# Patient Record
Sex: MALE | Race: WHITE | NOT HISPANIC OR LATINO | Employment: OTHER | URBAN - METROPOLITAN AREA
[De-identification: names, ages, dates, MRNs, and addresses within clinical notes are randomized per-mention and may not be internally consistent; named-entity substitution may affect disease eponyms.]

---

## 2017-01-07 ENCOUNTER — HOSPITAL ENCOUNTER (OUTPATIENT)
Dept: NON INVASIVE DIAGNOSTICS | Facility: HOSPITAL | Age: 62
Discharge: HOME | End: 2017-01-07
Attending: INTERNAL MEDICINE
Payer: COMMERCIAL

## 2017-01-07 DIAGNOSIS — I10 ESSENTIAL (PRIMARY) HYPERTENSION: ICD-10-CM

## 2017-01-07 DIAGNOSIS — E78.5 HYPERLIPIDEMIA: ICD-10-CM

## 2017-01-07 PROCEDURE — 93306 TTE W/DOPPLER COMPLETE: CPT

## 2017-04-03 ENCOUNTER — ALLSCRIPTS OFFICE VISIT (OUTPATIENT)
Dept: OTHER | Facility: OTHER | Age: 62
End: 2017-04-03

## 2017-12-04 ENCOUNTER — ALLSCRIPTS OFFICE VISIT (OUTPATIENT)
Dept: OTHER | Facility: OTHER | Age: 62
End: 2017-12-04

## 2017-12-06 NOTE — PROGRESS NOTES
Assessment  Assessed    1  Dyslipidemia (272 4) (E78 5)   2  Hypertension (401 9) (I10)    Plan  Dyslipidemia, Hypertension    · Follow-up visit in 6 months Evaluation and Treatment  Follow-up  Status: Hold For -Scheduling  Requested for: [id number]   Ordered; For: Dyslipidemia, Hypertension; Ordered By: Matt Velarde Performed:  Due: [id number]  Hypertension    · AmLODIPine Besylate 10 MG Oral Tablet; TAKE 1 TABLET DAILY   Rx By: Matt Velarde; Dispense: 30 Days ; #:30 Tablet; Refill: 5;Hypertension; TOÑITO = N; Sent To: [facility]    Discussion/Summary  Cardiology Discussion Summary Free Text Note Form [facility]:   1  Hypertension  Patient's blood pressure is well controlled with current Rx of amlodipine and losartan HCT  Continue same Rx  Blood pressures acceptable  Patient's prescription for amlodipine renewedDyslipidemia  Patient cholesterol profile is susceptible as per new cholesterol guidelines his risk is around 9%  Discussed with patient at length that as per the current guideline patient can be on moderate dose statin therapy  Patient has been tolerating Lipitor without any problems  Labs reviewed  Cholesterol has significantly improved  No muscle pain liver enzymes are acceptable  Continue same dose6 months  Goals and Barriers: The patient has the current Goals: To stay healthy  The patent has the current Barriers: None  Patient's Capacity to Self-Care: Patient is able to Self-Care  Medication SE Review and Pt Understands Tx: Possible side effects of new medications were reviewed with the patient/guardian today  Counseling Documentation With Imm: The patient was counseled regarding diagnostic results,-- instructions for management,-- risk factor reductions,-- prognosis,-- patient and family education,-- impressions,-- risks and benefits of treatment options,-- importance of compliance with treatment        Chief Complaint  Chief Complaint Free Text Note Form: Maria Fernanda Maria Teresa is here today for a routine followup  He Denies any cardiac complaints in the office today  JW   Chief Complaint Chronic Condition [facility]: Patient is here today for follow up of chronic conditions described in HPI  History of Present Illness  Cardiology HPI Free Text Note Form [facility]: 26-year-old male with history of hypertension hyperlipidemia benign prostate hypertrophy who was initially seen by me few years ago for hypertension  His cholesterol was previously diet controlled and has been doing well but over the year due to his age now his risk is higher and he is willing to go on statin therapy  He denies any chest pain or any shortness of breath  No fever no chills no PND no orthopnea no other significant complaint  came for follow-up  No new complaints  Denies any chest pain or any shortness of breath  No fever no chills no nausea no vomiting no PND no orthopnea  He is tolerating his medications very well  He had a blood test done at his work  Cholesterol has significantly improved  LFTs were acceptable  No other significant complaint  He still very active      Review of Systems  Cardiology Male ROS:    Cardiac: as noted in HPI  Skin: No complaints of nonhealing sores or skin rash  Genitourinary: No complaints of recurrent urinary tract infections, frequent urination at night, difficult urination, blood in urine, kidney stones, loss of bladder control, no kidney or prostate problems, no erectile dysfunction  Psychological: No complaints of feeling depressed, anxiety, panic attacks, or difficulty concentrating  General: No complaints of trouble sleeping, lack of energy, fatigue, appetite changes, weight changes, fever, frequent infections, or night sweats  Respiratory: No complaints of shortness of breath, cough with sputum, or wheezing  HEENT: No complaints of serious problems, hearing problems, nose problems, throat problems, or snoring    Gastrointestinal: No complaints of liver problems, nausea, vomiting, heartburn, constipation, bloody stools, diarrhea, problems swallowing, adbominal pain, or rectal bleeding  Hematologic: No complaints of bleeding disorders, anemia, blood clots, or excessive brusing  Neurological: No complaints of numbness, tingling, dizziness, weakness, seizures, headaches, syncope or fainting, AM fatigue, daytime sleepiness, no witnessed apnea episodes  Musculoskeletal: No complaints of arthritis, back pain, or painfull swelling  ROS Reviewed:   ROS reviewed  Active Problems  Problems    1  Dyslipidemia (272 4) (E78 5)   2  Hypertension (401 9) (I10)    Past Medical History  Active Problems And Past Medical History Reviewed: The active problems and past medical history were reviewed and updated today  Surgical History  Surgical History Reviewed: The surgical history was reviewed and updated today  Family History  Mother    1  Family history of diabetes mellitus (V18 0) (Z83 3)  Father    2  Family history of myocardial infarction (V17 3) (Z82 49)  Family History Reviewed: The family history was reviewed and updated today  Social History  Problems    · Never a smoker  Social History Reviewed: The social history was reviewed and updated today  Current Meds   1  AmLODIPine Besylate 10 MG Oral Tablet; TAKE 1 TABLET DAILY  Requested for: [id number]; Last FW:[id number] Ordered   2  Atorvastatin Calcium 10 MG Oral Tablet; TAKE 1 TABLET AT BEDTIME; Therapy: [id number] to (Samm Bates)  Requested for: 57UWB5474; Last Rx:02Led0117 Ordered   3  Losartan Potassium-HCTZ 50-12 5 MG Oral Tablet; TAKE 1 TABLET DAILY; Therapy: 23XBK1267 to (Yovani Hollis)  Requested for: 11Rnd2690; Last Rx:68Mmq0364 Ordered  Medication List Reviewed: The medication list was reviewed and updated today  Allergies  Medication    1   No Known Drug Allergies    Vitals  Vital Signs    Recorded: 16GUD0477 03:49PM   Heart Rate 69, Apical   Systolic 750, LUE, Sitting   Diastolic 70, LUE, Sitting Height 5 ft 4 in   Weight 138 lb    BMI Calculated 23 69   BSA Calculated 1 67   O2 Saturation 95, RA       Physical Exam   Constitutional  General appearance: No acute distress, well appearing and well nourished  Eyes  Conjunctiva and Sclera examination: Conjunctiva pink, sclera anicteric  Ears, Nose, Mouth, and Throat - Oropharynx: Clear, nares are clear, mucous membranes are moist   Neck  Neck and thyroid: Normal, supple, trachea midline, no thyromegaly  Pulmonary  Respiratory effort: No increased work of breathing or signs of respiratory distress  Auscultation of lungs: Clear to auscultation, no rales, no rhonchi, no wheezing, good air movement  Cardiovascular  Auscultation of heart: Normal rate and rhythm, normal S1 and S2, no murmurs  Carotid pulses: Normal, 2+ bilaterally  Peripheral vascular exam: Normal pulses throughout, no tenderness, erythema or swelling  Pedal pulses: Normal, 2+ bilaterally  Examination of extremities for edema and/or varicosities: Normal    Abdomen  Abdomen: Non-tender and no distention  Liver and spleen: No hepatomegaly or splenomegaly  Musculoskeletal Gait and station: Normal gait  -- Digits and nails: Normal without clubbing or cyanosis  -- Inspection/palpation of joints, bones, and muscles: Normal, ROM normal    Skin - Skin and subcutaneous tissue: Normal without rashes or lesions  Skin is warm and well perfused, normal turgor  Neurologic - Cranial nerves: II - XII intact  -- Speech: Normal    Psychiatric - Orientation to person, place, and time: Normal -- Mood and affect: Normal       Results/Data  Diagnostic Studies Reviewed Cardio: I personally reviewed the recording/images in the office today  My interpretation follows  Lab Review: Patient has blood test done outside  Cholesterol was 147 triglyceride 52 HDL 60 LDL 76  LFTs are acceptable  Hemoglobin 14 4 hematocrit 45  HbA1c 6 0  LFTs were acceptable  Potassium 3 8 sodium 141    Stress Test: Nuclear stress test in December 2015 shows no ischemia EF was 60%  Echocardiogram/TAMIKA: Echo Doppler in 0223 shows a systolic function, mild MR, and a repeat echo in January 2017 shows normal systolic function with no significant change  Mild MR was noted     ECG Report: Twelve-lead EKG done 4/3/2017 shows normal sinus done no significant fracture changes no change from old EKG      Signatures   Electronically signed by : SONA Kovacs ; Dec  4 2017  4:46PM EST                       (Author)

## 2018-01-12 VITALS
WEIGHT: 134 LBS | SYSTOLIC BLOOD PRESSURE: 122 MMHG | HEART RATE: 71 BPM | HEIGHT: 64 IN | BODY MASS INDEX: 22.88 KG/M2 | DIASTOLIC BLOOD PRESSURE: 70 MMHG | OXYGEN SATURATION: 98 %

## 2018-01-22 VITALS
HEART RATE: 69 BPM | BODY MASS INDEX: 23.56 KG/M2 | WEIGHT: 138 LBS | DIASTOLIC BLOOD PRESSURE: 70 MMHG | SYSTOLIC BLOOD PRESSURE: 118 MMHG | OXYGEN SATURATION: 95 % | HEIGHT: 64 IN

## 2018-04-02 DIAGNOSIS — I10 ESSENTIAL HYPERTENSION: Primary | ICD-10-CM

## 2018-04-02 RX ORDER — LOSARTAN POTASSIUM AND HYDROCHLOROTHIAZIDE 12.5; 5 MG/1; MG/1
1 TABLET ORAL DAILY
COMMUNITY
Start: 2017-03-01 | End: 2018-04-02 | Stop reason: SDUPTHER

## 2018-04-02 RX ORDER — LOSARTAN POTASSIUM AND HYDROCHLOROTHIAZIDE 12.5; 5 MG/1; MG/1
1 TABLET ORAL DAILY
Qty: 30 TABLET | Refills: 5 | Status: SHIPPED | OUTPATIENT
Start: 2018-04-02 | End: 2018-09-29 | Stop reason: SDUPTHER

## 2018-04-30 ENCOUNTER — TELEPHONE (OUTPATIENT)
Dept: CARDIOLOGY CLINIC | Facility: CLINIC | Age: 63
End: 2018-04-30

## 2018-04-30 DIAGNOSIS — E78.5 DYSLIPIDEMIA: Primary | ICD-10-CM

## 2018-04-30 RX ORDER — ATORVASTATIN CALCIUM 10 MG/1
1 TABLET, FILM COATED ORAL
COMMUNITY
Start: 2017-04-03 | End: 2018-04-30 | Stop reason: SDUPTHER

## 2018-04-30 RX ORDER — ATORVASTATIN CALCIUM 10 MG/1
10 TABLET, FILM COATED ORAL
Qty: 30 TABLET | Refills: 3 | Status: SHIPPED | OUTPATIENT
Start: 2018-04-30 | End: 2018-08-26 | Stop reason: SDUPTHER

## 2018-06-12 RX ORDER — AMLODIPINE BESYLATE 10 MG/1
1 TABLET ORAL DAILY
COMMUNITY
End: 2018-06-26 | Stop reason: SDUPTHER

## 2018-06-26 DIAGNOSIS — I10 ESSENTIAL HYPERTENSION: Primary | ICD-10-CM

## 2018-06-26 RX ORDER — AMLODIPINE BESYLATE 10 MG/1
10 TABLET ORAL DAILY
Qty: 30 TABLET | Refills: 5 | Status: SHIPPED | OUTPATIENT
Start: 2018-06-26 | End: 2019-01-15 | Stop reason: SDUPTHER

## 2018-07-03 ENCOUNTER — OFFICE VISIT (OUTPATIENT)
Dept: CARDIOLOGY CLINIC | Facility: CLINIC | Age: 63
End: 2018-07-03

## 2018-07-03 VITALS
SYSTOLIC BLOOD PRESSURE: 108 MMHG | OXYGEN SATURATION: 97 % | HEIGHT: 64 IN | HEART RATE: 58 BPM | WEIGHT: 136 LBS | BODY MASS INDEX: 23.22 KG/M2 | DIASTOLIC BLOOD PRESSURE: 60 MMHG

## 2018-07-03 DIAGNOSIS — E78.5 DYSLIPIDEMIA: ICD-10-CM

## 2018-07-03 DIAGNOSIS — F41.9 ANXIETY: ICD-10-CM

## 2018-07-03 DIAGNOSIS — I10 ESSENTIAL HYPERTENSION: ICD-10-CM

## 2018-07-03 DIAGNOSIS — R01.1 CARDIAC MURMUR: ICD-10-CM

## 2018-07-03 PROCEDURE — 99214 OFFICE O/P EST MOD 30 MIN: CPT | Performed by: INTERNAL MEDICINE

## 2018-07-03 PROCEDURE — 93000 ELECTROCARDIOGRAM COMPLETE: CPT | Performed by: INTERNAL MEDICINE

## 2018-07-03 RX ORDER — PHENOL 1.4 %
600 AEROSOL, SPRAY (ML) MUCOUS MEMBRANE 2 TIMES DAILY WITH MEALS
COMMUNITY
End: 2022-07-28

## 2018-07-03 RX ORDER — CHLORAL HYDRATE 500 MG
1000 CAPSULE ORAL DAILY
COMMUNITY
End: 2022-07-28

## 2018-07-03 RX ORDER — GLUCOSAMINE SULFATE 500 MG
500 CAPSULE ORAL
COMMUNITY

## 2018-07-03 NOTE — PROGRESS NOTES
Progress Note - Cardiology Office  [facility]    Dian Soto 58 y o  male MRN: [id number]  : 1955  Encounter: [id number]      Assessment:     1  Essential hypertension    2  Dyslipidemia    3  Anxiety    4  Cardiac murmur        Discussion Summary and Plan:  1  Hypertension  Patient's blood pressure is well controlled with current Rx of amlodipine and losartan HCT  Continue same Rx  Blood pressures acceptable  Patient's prescription for amlodipine renewed  Since patient is on diuretics and Ace inhibitors will check electrolytes  Check CBC    2  Cardiac murmur  Patient's echo reviewed with the patient  He did have thickening of aortic valve with mild MR  No other significant valvular disease  The loudness of murmur may be high it as patient is thin chested  3  Dyslipidemia  Patient cholesterol profile is susceptible as per new cholesterol guidelines his risk is around 9%  On statin therapy  Check fasting lipids and LFTs and TSH    4  Anxiety  All issues related to patient condition discussed with him at length  Follow-up in 6 months    Counseling :  A description of the counseling  Goals and Barriers  Patient's ability to self care: Yes  Medication side effect reviewed with patient in detail and all their questions answered to their satisfaction  HPI :     Dian Soto is a 58y o  year old male who came for follow up  Patient has history of hypertension hyperlipidemia benign prostate hypertrophy who was initially seen by me few years ago for hypertension  His cholesterol was previously diet controlled and has been doing well but over the year due to his age now his risk is higher and he is willing to go on statin therapy  He denies any chest pain or any shortness of breath  No fever no chills no PND no orthopnea no other significant complaint   /2017came for follow-up  No new complaints  Denies any chest pain or any shortness of breath   No fever no chills no nausea no vomiting no PND no orthopnea  He is tolerating his medications very well  He had a blood test done at his work  Cholesterol has significantly improved  LFTs were acceptable  No other significant complaint  He still very active  07/03/2018  Above reviewed  Patient came for follow-up  He is doing much better  Blood pressure is acceptable  He is tolerating his medications well  Denies any chest pain and shortness of breath  Arthritis is also better  No nausea no vomiting no other significant complaint  Review of Systems   Constitutional: Negative for activity change, chills, diaphoresis, fever and unexpected weight change  HENT: Negative for congestion  Eyes: Negative for discharge and redness  Respiratory: Negative for cough, chest tightness, shortness of breath and wheezing  Cardiovascular: Negative  Negative for chest pain, palpitations and leg swelling  Gastrointestinal: Negative for abdominal pain, diarrhea and nausea  Endocrine: Negative  Genitourinary: Negative for decreased urine volume and urgency  Musculoskeletal: Negative  Negative for arthralgias, back pain and gait problem  Skin: Negative for rash and wound  Allergic/Immunologic: Negative  Neurological: Negative for dizziness, seizures, syncope, weakness, light-headedness and headaches  Hematological: Negative  Psychiatric/Behavioral: Negative for agitation and confusion  The patient is not nervous/anxious  Historical Information   History reviewed  No pertinent past medical history  History reviewed  No pertinent surgical history    History   Alcohol Use    Yes     Comment: occ     History   Drug Use No     History   Smoking Status    Never Smoker   Smokeless Tobacco    Never Used     Family History:   Family History   Problem Relation Age of Onset    Kidney failure Father     Hypertension Brother        Meds/Allergies     No Known Allergies    Current Outpatient Prescriptions:    amLODIPine (NORVASC) 10 mg tablet, Take 1 tablet (10 mg total) by mouth daily, Disp: 30 tablet, Rfl: 5    atorvastatin (LIPITOR) 10 mg tablet, Take 1 tablet (10 mg total) by mouth daily after dinner, Disp: 30 tablet, Rfl: 3    calcium carbonate (OS-CLEVELAND) 600 MG tablet, Take 600 mg by mouth 2 (two) times a day with meals, Disp: , Rfl:     glucosamine 500 MG CAPS capsule, Take 500 mg by mouth, Disp: , Rfl:     losartan-hydrochlorothiazide (HYZAAR) 50-12 5 mg per tablet, Take 1 tablet by mouth daily, Disp: 30 tablet, Rfl: 5    Menaquinone-7 (VITAMIN K2 PO), Take by mouth, Disp: , Rfl:     Omega-3 Fatty Acids (FISH OIL) 1,000 mg, Take 1,000 mg by mouth daily, Disp: , Rfl:     Vitals: Blood pressure 108/60, pulse 58, height 5' 4" (1 626 m), weight 61 7 kg (136 lb), SpO2 97 %  Body mass index is 23 34 kg/m²  Vitals:    07/03/18 1227   Weight: 61 7 kg (136 lb)     BP Readings from Last 3 Encounters:   07/03/18 108/60   12/04/17 118/70   04/03/17 122/70       Physical Exam:    Physical Exam     Neurologic:  Alert & oriented x 3, no new focal deficits, Not in any acute distress,  Constitutional:  Well developed, well nourished, non-toxic appearance   Eyes:  Pupil equal and reacting to light, conjunctiva normal, No JVP, No LNP   HENT:  Atraumatic, oropharynx moist, Neck- normal range of motion, no tenderness,  Neck supple   Respiratory:  Bilateral air entry, mostly clear to auscultation  Cardiovascular: S1-S2 regular with a 3/6 ejection systolic murmur and S4 is present  GI:  Soft, nondistended, normal bowel sounds, nontender, no hepatosplenomegaly appreciated  Musculoskeletal:  No edema, no tenderness, no deformities  Skin:  Well hydrated, no rash   Lymphatic:  No lymphadenopathy noted   Extremities:  No edema and distal pulses are present      Diagnostic Studies Review Cardio:  Lab Review: Patient has blood test done outside  Cholesterol was 147 triglyceride 52 HDL 60 LDL 76  LFTs are acceptable   Hemoglobin 14 4 hematocrit 45  HbA1c 6 0  LFTs were acceptable  Potassium 3 8 sodium 141  Stress Test: Nuclear stress test in 2015 shows no ischemia EF was 60%  Echocardiogram/TAMIKA: Echo Doppler in  shows a systolic function, mild MR, and a repeat echo in 2017 shows normal systolic function with no significant change  Mild MR was noted  ECG Report: Twelve-lead EKG done 4/3/2017 shows normal sinus done no significant   ST changes no change from old EKG     Repeat EKG shows normal sinus rhythm heart rate 58 beats per minute  No significant ST changes  High voltage may be due to thin chest         Cardiac testing:   Results for orders placed during the hospital encounter of 17   Echo complete with contrast if indicated    Narrative [address]  [address] [facility] [address]  [phone]    Transthoracic Echocardiogram  2D, M-mode, Doppler, and Color Doppler    Study date:  2017    Patient: Shazia Bowden  MR number: [id number]  Account number: [de-identified]  : 1955  Age: 57 years  Gender: Male  Status: Routine  Location: Echo lab  Height: 64 in  Weight: 137 7 lb  BP: 154/ 85 mmHg    Indications: HTN, Hyperlipidemia    Diagnoses: I11 9 - Hypertensive heart disease without heart failure    Sonographer:  ELENITA Kitchen  Primary Physician:  Mynor Sierra MD  Referring Physician:  Juan C Bautista MD  Group:  Marina Bloom  Interpreting Physician:  Juan C Bautista MD    SUMMARY    LEFT VENTRICLE:  Systolic function was normal by visual assessment  Ejection fraction was  estimated in the range of 55 % to 60 % to be 55 %  Wall thickness was at the upper limits of normal   Doppler parameters were consistent with abnormal left ventricular relaxation  (grade 1 diastolic dysfunction)  MITRAL VALVE:  There was mild regurgitation  TRICUSPID VALVE:  There was mild regurgitation  Estimated peak PA pressure was 35 mmHg      PULMONIC VALVE:  There was mild regurgitation  HISTORY: PRIOR HISTORY: HTN, Hyperlipidemia    PROCEDURE: The procedure was performed in the echo lab  This was a routine  study  The transthoracic approach was used  The study included complete 2D  imaging, M-mode, complete spectral Doppler, and color Doppler  The heart rate  was 67 bpm, at the start of the study  Images were obtained from the  parasternal, apical, subcostal, and suprasternal notch acoustic windows  Image  quality was good  LEFT VENTRICLE: Size was normal  Systolic function was normal by visual  assessment  Ejection fraction was estimated in the range of 55 % to 60 % to be  55 %  Wall thickness was at the upper limits of normal  DOPPLER: Doppler  parameters were consistent with abnormal left ventricular relaxation (grade 1  diastolic dysfunction)  RIGHT VENTRICLE: The size was normal  Systolic function was normal     LEFT ATRIUM: Size was at the upper limits of normal     RIGHT ATRIUM: Size was normal     MITRAL VALVE: Valve structure was normal  There was normal leaflet separation  DOPPLER: The transmitral velocity was within the normal range  There was no  evidence for stenosis  There was mild regurgitation  AORTIC VALVE: The valve was trileaflet  Leaflets exhibited mildly increased  thickness  DOPPLER: There was no evidence for stenosis  There was no  regurgitation  TRICUSPID VALVE: DOPPLER: There was mild regurgitation  Estimated peak PA  pressure was 35 mmHg  PULMONIC VALVE: Not well visualized  DOPPLER: There was mild regurgitation  PERICARDIUM: There was no thickening or calcification  There was no pericardial  effusion  SYSTEMIC VEINS: IVC: The inferior vena cava was normal in size   Respirophasic  changes were normal     SYSTEM MEASUREMENT TABLES    2D mode  AoR Diam 2D: 3 4 cm  LA Diam (2D): 3 5 cm  LA/Ao (2D): 1 03  FS (2D Teich): 30 1 %  IVSd (2D): 1 13 cm  LVDEV: 69 6 cm³  LVESV: 29 3 cm³  LVIDd(2D): 3 99 cm  LVISd (2D): 2 79 cm  LVOT Area 2D: 3 14 cm squared  LVPWd (2D): 1 21 cm  SV (Teich): 40 3 cm³    Apical four chamber  LVEF A4C: 60 %    Unspecified Scan Mode  SHONA Cont Eq (Peak Anup): 2 19 cm squared  LVOT Diam : 2 cm  LVOT Vmax: 1060 mm/s  LVOT Vmax; Mean: 1060 mm/s  Peak Grad ; Mean: 4 mm[Hg]  MV Peak A Anup: 795 mm/s  MV Peak E Anup  Mean: 597 mm/s  MVA (PHT): 3 44 cm squared  PHT: 64 ms  Max P mm[Hg]  V Max: 2610 mm/s  Vmax: 2640 mm/s  RA Area: 20 2 cm squared  RA Volume: 58 4 cm³  TAPSE: 2 1 cm    [employer] Accredited Echocardiography Laboratory    Prepared and electronically signed by    James Gama MD  Signed 2017 20:52:49       Lab Review  Labs requested    Dr James Gama MD [facility] [place]      "This note has been constructed using a voice recognition system  Therefore there may be syntax, spelling, and/or grammatical errors   Please call if you have any questions  "

## 2018-08-26 DIAGNOSIS — E78.5 DYSLIPIDEMIA: ICD-10-CM

## 2018-08-27 RX ORDER — ATORVASTATIN CALCIUM 10 MG/1
TABLET, FILM COATED ORAL
Qty: 30 TABLET | Refills: 3 | Status: SHIPPED | OUTPATIENT
Start: 2018-08-27 | End: 2019-01-15 | Stop reason: SDUPTHER

## 2018-09-29 DIAGNOSIS — I10 ESSENTIAL HYPERTENSION: ICD-10-CM

## 2018-09-29 RX ORDER — LOSARTAN POTASSIUM AND HYDROCHLOROTHIAZIDE 12.5; 5 MG/1; MG/1
TABLET ORAL
Qty: 30 TABLET | Refills: 5 | Status: SHIPPED | OUTPATIENT
Start: 2018-09-29 | End: 2019-03-22 | Stop reason: ALTCHOICE

## 2018-11-19 ENCOUNTER — TRANSCRIBE ORDERS (OUTPATIENT)
Dept: ADMINISTRATIVE | Facility: HOSPITAL | Age: 63
End: 2018-11-19

## 2018-11-19 DIAGNOSIS — R31.1 BENIGN ESSENTIAL MICROSCOPIC HEMATURIA: Primary | ICD-10-CM

## 2018-11-24 ENCOUNTER — HOSPITAL ENCOUNTER (OUTPATIENT)
Dept: RADIOLOGY | Facility: HOSPITAL | Age: 63
Discharge: HOME | End: 2018-11-24
Attending: SPECIALIST
Payer: COMMERCIAL

## 2018-11-24 DIAGNOSIS — R31.1 BENIGN ESSENTIAL MICROSCOPIC HEMATURIA: ICD-10-CM

## 2018-11-24 PROCEDURE — 74178 CT ABD&PLV WO CNTR FLWD CNTR: CPT

## 2018-11-24 RX ADMIN — IOHEXOL 100 ML: 350 INJECTION, SOLUTION INTRAVENOUS at 07:49

## 2019-01-15 DIAGNOSIS — I10 ESSENTIAL HYPERTENSION: ICD-10-CM

## 2019-01-15 DIAGNOSIS — E78.5 DYSLIPIDEMIA: ICD-10-CM

## 2019-01-15 RX ORDER — AMLODIPINE BESYLATE 10 MG/1
10 TABLET ORAL DAILY
Qty: 30 TABLET | Refills: 0 | Status: SHIPPED | OUTPATIENT
Start: 2019-01-15 | End: 2019-01-21 | Stop reason: SDUPTHER

## 2019-01-15 RX ORDER — ATORVASTATIN CALCIUM 10 MG/1
10 TABLET, FILM COATED ORAL DAILY
Qty: 90 TABLET | Refills: 0 | Status: SHIPPED | OUTPATIENT
Start: 2019-01-15 | End: 2019-01-21 | Stop reason: SDUPTHER

## 2019-01-15 NOTE — TELEPHONE ENCOUNTER
[facility] for Amlodipine 10mg takes 1 tab daily, and atorvastatin 10mg takes 1 tab daily sees dr Nadira Briceño

## 2019-01-20 NOTE — PROGRESS NOTES
Progress Note - Cardiology Office  [facility]    Agatha Mcgarry 58 y o  male MRN: [id number]  : 1955  Encounter: [id number]      Assessment:     1  Essential hypertension    2  Dyslipidemia    3  Anxiety    4  Cardiac murmur        Discussion Summary and Plan:  1  Hypertension  Patient blood pressure is pretty well controlled with current therapy  He is tolerating amlodipine and losartan HCT  Continue same medications  He is scheduled to have repeat blood test with primary care doctor and will send us a copy  2   Cardiac murmur  Patient's echo reviewed with the patient  He did have thickening of aortic valve with mild MR  No other significant valvular disease  The loudness of murmur may be high it as patient is thin chested  All these issues discussed with patient at length again  3  Dyslipidemia  Patient is tolerating low-dose statin without any problem  Prescriptions were renewed    4  Anxiety  All issues related to patient condition discussed with him at length  All their questions answered to their satisfaction  Follow-up in 6 months    Counseling :  A description of the counseling  Goals and Barriers  Patient's ability to self care: Yes  Medication side effect reviewed with patient in detail and all their questions answered to their satisfaction  HPI :     Agatha Mcgarry is a 58y o  year old male who came for follow up  Patient has history of hypertension hyperlipidemia benign prostate hypertrophy who was initially seen by me few years ago for hypertension  His cholesterol was previously diet controlled and has been doing well but over the year due to his age now his risk is higher and he is willing to go on statin therapy  He denies any chest pain or any shortness of breath  No fever no chills no PND no orthopnea no other significant complaint  Leonardo Yung 2018  Above reviewed  Patient came for follow-up  He is doing much better    Blood pressure is acceptable  He is tolerating his medications well  Denies any chest pain and shortness of breath  Arthritis is also better  No nausea no vomiting no other significant complaint  01/21/2019  Above reviewed  Patient came for follow-up  He is doing great  Blood pressure is pretty well controlled  He is tolerating his medications well  He does have cardiac murmur most likely due to mitral valve regurgitation  EKG shows minimal criteria for LVH most likely due to 10 chest   No fever no chills no nausea no vomiting no PND no orthopnea no other significant issues  Review of Systems   Constitutional: Negative for activity change, chills, diaphoresis, fever and unexpected weight change  HENT: Negative for congestion  Eyes: Negative for discharge and redness  Respiratory: Negative for cough, chest tightness, shortness of breath and wheezing  Cardiovascular: Negative  Negative for chest pain, palpitations and leg swelling  Gastrointestinal: Negative for abdominal pain, diarrhea and nausea  Endocrine: Negative  Genitourinary: Negative for decreased urine volume and urgency  Musculoskeletal: Negative  Negative for arthralgias, back pain and gait problem  Skin: Negative for rash and wound  Allergic/Immunologic: Negative  Neurological: Negative for dizziness, seizures, syncope, weakness, light-headedness and headaches  Hematological: Negative  Psychiatric/Behavioral: Negative for agitation and confusion  The patient is not nervous/anxious  Historical Information   History reviewed  No pertinent past medical history  History reviewed  No pertinent surgical history    History   Alcohol Use    Yes     Comment: occ     History   Drug Use No     History   Smoking Status    Never Smoker   Smokeless Tobacco    Never Used     Family History:   Family History   Problem Relation Age of Onset    Kidney failure Father     Hypertension Brother        Meds/Allergies     Allergies Allergen Reactions    Tadalafil        Current Outpatient Prescriptions:     amLODIPine (NORVASC) 10 mg tablet, Take 1 tablet (10 mg total) by mouth daily, Disp: 90 tablet, Rfl: 3    atorvastatin (LIPITOR) 10 mg tablet, Take 1 tablet (10 mg total) by mouth daily, Disp: 90 tablet, Rfl: 2    calcium carbonate (OS-CLEVELAND) 600 MG tablet, Take 600 mg by mouth 2 (two) times a day with meals, Disp: , Rfl:     glucosamine 500 MG CAPS capsule, Take 500 mg by mouth, Disp: , Rfl:     losartan-hydrochlorothiazide (HYZAAR) 50-12 5 mg per tablet, TAKE 1 TABLET BY MOUTH ONCE DAILY, Disp: 30 tablet, Rfl: 5    Menaquinone-7 (VITAMIN K2 PO), Take by mouth, Disp: , Rfl:     Omega-3 Fatty Acids (FISH OIL) 1,000 mg, Take 1,000 mg by mouth daily, Disp: , Rfl:     Vitals: Blood pressure 130/72, pulse 74, height 5' 4" (1 626 m), weight 63 2 kg (139 lb 6 4 oz), SpO2 96 %  Body mass index is 23 93 kg/m²  Vitals:    01/21/19 1627   Weight: 63 2 kg (139 lb 6 4 oz)     BP Readings from Last 3 Encounters:   01/21/19 130/72   07/03/18 108/60   12/04/17 118/70         Physical Exam   Constitutional: He is oriented to person, place, and time  He appears well-developed  No distress  HENT:   Head: Normocephalic and atraumatic  Eyes: Pupils are equal, round, and reactive to light  Neck: Normal range of motion  Neck supple  No JVD present  No thyromegaly present  Cardiovascular: Normal rate, regular rhythm and intact distal pulses  Murmur heard  S1-S2 regular with 3/6 ejection systolic murmur S4 present   Pulmonary/Chest: Effort normal and breath sounds normal  No respiratory distress  He has no wheezes  He has no rales  Abdominal: Soft  Bowel sounds are normal  He exhibits no distension  There is no tenderness  There is no rebound  Musculoskeletal: Normal range of motion  He exhibits no edema or tenderness  Neurological: He is alert and oriented to person, place, and time  Skin: Skin is warm and dry   He is not diaphoretic  Psychiatric: He has a normal mood and affect  His behavior is normal  Judgment normal         Diagnostic Studies Review Cardio:  Lab Review: Patient has blood test done outside  Cholesterol was 147 triglyceride 52 HDL 60 LDL 76  LFTs are acceptable  Hemoglobin 14 4 hematocrit 45  HbA1c 6 0  LFTs were acceptable  Potassium 3 8 sodium 141  Stress Test: Nuclear stress test in 2015 shows no ischemia EF was 60%  Echocardiogram/TAMIKA: Echo Doppler in  shows a systolic function, mild MR, and a repeat echo in 2017 shows normal systolic function with no significant change  Mild MR was noted  ECG Report: Twelve-lead EKG done 4/3/2017 shows normal sinus done no significant   ST changes no change from old EKG     Repeat EKG shows normal sinus rhythm heart rate 58 beats per minute  No significant ST changes  High voltage may be due to thin chest     Twelve lead EKG done in our office 2019 shows normal sinus rhythm LVH by voltage  No other significant ST changes  Cardiac testing:   Results for orders placed during the hospital encounter of 17   Echo complete with contrast if indicated    Narrative [address]  [address] [facility]   [phone]    Transthoracic Echocardiogram  2D, M-mode, Doppler, and Color Doppler    Study date:  2017    Patient: Kierra Felipe  MR number: [id number]  Account number: [de-identified]  : 1955  Age: 57 years  Gender: Male  Status: Routine  Location: Echo lab  Height: 64 in  Weight: 137 7 lb  BP: 154/ 85 mmHg    Indications: HTN, Hyperlipidemia    Diagnoses: I11 9 - Hypertensive heart disease without heart failure    Sonographer:  ELENITA Pal  Primary Physician:  Mark Hernández MD  Referring Physician:  Yany Portillo MD  Group:  Favio Nelson  Interpreting Physician:  Yany Portillo MD    SUMMARY    LEFT VENTRICLE:  Systolic function was normal by visual assessment  Ejection fraction was  estimated in the range of 55 % to 60 % to be 55 %  Wall thickness was at the upper limits of normal   Doppler parameters were consistent with abnormal left ventricular relaxation  (grade 1 diastolic dysfunction)  MITRAL VALVE:  There was mild regurgitation  TRICUSPID VALVE:  There was mild regurgitation  Estimated peak PA pressure was 35 mmHg  PULMONIC VALVE:  There was mild regurgitation  HISTORY: PRIOR HISTORY: HTN, Hyperlipidemia    PROCEDURE: The procedure was performed in the echo lab  This was a routine  study  The transthoracic approach was used  The study included complete 2D  imaging, M-mode, complete spectral Doppler, and color Doppler  The heart rate  was 67 bpm, at the start of the study  Images were obtained from the  parasternal, apical, subcostal, and suprasternal notch acoustic windows  Image  quality was good  LEFT VENTRICLE: Size was normal  Systolic function was normal by visual  assessment  Ejection fraction was estimated in the range of 55 % to 60 % to be  55 %  Wall thickness was at the upper limits of normal  DOPPLER: Doppler  parameters were consistent with abnormal left ventricular relaxation (grade 1  diastolic dysfunction)  RIGHT VENTRICLE: The size was normal  Systolic function was normal     LEFT ATRIUM: Size was at the upper limits of normal     RIGHT ATRIUM: Size was normal     MITRAL VALVE: Valve structure was normal  There was normal leaflet separation  DOPPLER: The transmitral velocity was within the normal range  There was no  evidence for stenosis  There was mild regurgitation  AORTIC VALVE: The valve was trileaflet  Leaflets exhibited mildly increased  thickness  DOPPLER: There was no evidence for stenosis  There was no  regurgitation  TRICUSPID VALVE: DOPPLER: There was mild regurgitation  Estimated peak PA  pressure was 35 mmHg  PULMONIC VALVE: Not well visualized  DOPPLER: There was mild regurgitation      PERICARDIUM: There was no thickening or calcification  There was no pericardial  effusion  SYSTEMIC VEINS: IVC: The inferior vena cava was normal in size  Respirophasic  changes were normal     SYSTEM MEASUREMENT TABLES    2D mode  AoR Diam 2D: 3 4 cm  LA Diam (2D): 3 5 cm  LA/Ao (2D): 1 03  FS (2D Teich): 30 1 %  IVSd (2D): 1 13 cm  LVDEV: 69 6 cm³  LVESV: 29 3 cm³  LVIDd(2D): 3 99 cm  LVISd (2D): 2 79 cm  LVOT Area 2D: 3 14 cm squared  LVPWd (2D): 1 21 cm  SV (Teich): 40 3 cm³    Apical four chamber  LVEF A4C: 60 %    Unspecified Scan Mode  ISABEL Cont Eq (Peak Anup): 2 19 cm squared  LVOT Diam : 2 cm  LVOT Vmax: 1060 mm/s  LVOT Vmax; Mean: 1060 mm/s  Peak Grad ; Mean: 4 mm[Hg]  MV Peak A Anup: 795 mm/s  MV Peak E Anpu  Mean: 597 mm/s  MVA (PHT): 3 44 cm squared  PHT: 64 ms  Max P mm[Hg]  V Max: 2610 mm/s  Vmax: 2640 mm/s  RA Area: 20 2 cm squared  RA Volume: 58 4 cm³  TAPSE: 2 1 cm    [employer] Accredited Echocardi[facility]    Prepared and electronically signed by    Isabel Shepherd MD  Signed 2017 20:52:49       Lab Review  Labs requested    Dr Isabel Shepherd MD [facility]      "This note has been constructed using a voice recognition system  Therefore there may be syntax, spelling, and/or grammatical errors   Please call if you have any questions  "

## 2019-01-21 ENCOUNTER — OFFICE VISIT (OUTPATIENT)
Dept: CARDIOLOGY CLINIC | Facility: CLINIC | Age: 64
End: 2019-01-21
Payer: COMMERCIAL

## 2019-01-21 VITALS
HEIGHT: 64 IN | OXYGEN SATURATION: 96 % | HEART RATE: 74 BPM | DIASTOLIC BLOOD PRESSURE: 72 MMHG | BODY MASS INDEX: 23.8 KG/M2 | SYSTOLIC BLOOD PRESSURE: 130 MMHG | WEIGHT: 139.4 LBS

## 2019-01-21 DIAGNOSIS — F41.9 ANXIETY: ICD-10-CM

## 2019-01-21 DIAGNOSIS — R01.1 CARDIAC MURMUR: ICD-10-CM

## 2019-01-21 DIAGNOSIS — E78.5 DYSLIPIDEMIA: ICD-10-CM

## 2019-01-21 DIAGNOSIS — I10 ESSENTIAL HYPERTENSION: ICD-10-CM

## 2019-01-21 PROCEDURE — 99214 OFFICE O/P EST MOD 30 MIN: CPT | Performed by: INTERNAL MEDICINE

## 2019-01-21 PROCEDURE — 93000 ELECTROCARDIOGRAM COMPLETE: CPT | Performed by: INTERNAL MEDICINE

## 2019-01-21 RX ORDER — AMLODIPINE BESYLATE 10 MG/1
10 TABLET ORAL DAILY
Qty: 90 TABLET | Refills: 3 | Status: SHIPPED | OUTPATIENT
Start: 2019-01-21 | End: 2020-02-16

## 2019-01-21 RX ORDER — ATORVASTATIN CALCIUM 10 MG/1
10 TABLET, FILM COATED ORAL DAILY
Qty: 90 TABLET | Refills: 2 | Status: SHIPPED | OUTPATIENT
Start: 2019-01-21 | End: 2020-03-03

## 2019-03-22 ENCOUNTER — OFFICE VISIT (OUTPATIENT)
Dept: OBGYN CLINIC | Facility: CLINIC | Age: 64
End: 2019-03-22
Payer: COMMERCIAL

## 2019-03-22 VITALS
HEART RATE: 71 BPM | HEIGHT: 64 IN | WEIGHT: 140 LBS | SYSTOLIC BLOOD PRESSURE: 117 MMHG | BODY MASS INDEX: 23.9 KG/M2 | DIASTOLIC BLOOD PRESSURE: 72 MMHG

## 2019-03-22 DIAGNOSIS — M54.50 ACUTE MIDLINE LOW BACK PAIN WITHOUT SCIATICA: Primary | ICD-10-CM

## 2019-03-22 PROCEDURE — 99203 OFFICE O/P NEW LOW 30 MIN: CPT | Performed by: ORTHOPAEDIC SURGERY

## 2019-03-22 RX ORDER — LOSARTAN POTASSIUM 50 MG/1
TABLET ORAL DAILY
COMMUNITY
End: 2019-05-22 | Stop reason: SDUPTHER

## 2019-03-22 NOTE — PROGRESS NOTES
Assessment/Plan:  1  Acute midline low back pain without sciatica  Ambulatory referral to Physical Therapy       Nalini Mercer has acute low back pain which appears to be muscular in nature  There is no concerning signs of radiculopathy on examination  Would like for him to start with physical therapy at this time  He can continue with over-the-counter heat and anti-inflammatories as needed  He will follow up in the office in 6 weeks if his pain persists following physical therapy  Subjective:   Mirela Lew is a 58 y o  male who presents for evaluation for low back pain  He denies any particular injury or trauma  He states that he has had a history of intermittent chronic midline low back pain without radiation  He states in the last 2 days it seems to have increased  He has a pain that is constant and aching and throbbing at the lower portion of his lumbar spine and radiates across the low back  He denies any shooting pain down into his legs  He denies any numbness or tingling into his feet  Denies any history of lumbar radiculopathy or disc herniation  He states that the pain seems to worsen with driving long distances in his car and improves with heat and motion  He has also been taking anti-inflammatories over-the-counter which helps slightly  Review of Systems   Constitutional: Negative for chills, fever and unexpected weight change  HENT: Negative for hearing loss, nosebleeds and sore throat  Eyes: Negative for pain, redness and visual disturbance  Respiratory: Negative for cough, shortness of breath and wheezing  Cardiovascular: Negative for chest pain, palpitations and leg swelling  Gastrointestinal: Negative for abdominal pain, nausea and vomiting  Endocrine: Positive for polyuria  Negative for polyphagia  Genitourinary: Negative for dysuria and hematuria  Musculoskeletal:        See HPI   Skin: Negative for rash and wound     Neurological: Negative for dizziness, numbness and headaches  Psychiatric/Behavioral: Negative for decreased concentration and suicidal ideas  The patient is not nervous/anxious  History reviewed  No pertinent past medical history  History reviewed  No pertinent surgical history  Family History   Problem Relation Age of Onset    Kidney failure Father     Hypertension Brother        Social History     Occupational History    Not on file   Tobacco Use    Smoking status: Never Smoker    Smokeless tobacco: Never Used   Substance and Sexual Activity    Alcohol use: Yes     Comment: occ    Drug use: No    Sexual activity: Not on file         Current Outpatient Medications:     amLODIPine (NORVASC) 10 mg tablet, Take 1 tablet (10 mg total) by mouth daily, Disp: 90 tablet, Rfl: 3    atorvastatin (LIPITOR) 10 mg tablet, Take 1 tablet (10 mg total) by mouth daily, Disp: 90 tablet, Rfl: 2    calcium carbonate (OS-CLEVELAND) 600 MG tablet, Take 600 mg by mouth 2 (two) times a day with meals, Disp: , Rfl:     glucosamine 500 MG CAPS capsule, Take 500 mg by mouth, Disp: , Rfl:     Menaquinone-7 (VITAMIN K2 PO), Take by mouth, Disp: , Rfl:     Omega-3 Fatty Acids (FISH OIL) 1,000 mg, Take 1,000 mg by mouth daily, Disp: , Rfl:     losartan (COZAAR) 50 mg tablet, Daily, Disp: , Rfl:     Allergies   Allergen Reactions    Tadalafil        Objective:  Vitals:    03/22/19 1412   BP: 117/72   Pulse: 71       Back Exam     Tenderness   Back tenderness location: Tenderness to palpation over bilateral lumbar paraspinal musculature  Range of Motion   The patient has normal back ROM      Muscle Strength   Right Quadriceps:  5/5   Left Quadriceps:  5/5   Right Hamstrings:  5/5   Left Hamstrings:  5/5     Tests   Straight leg raise right: negative  Straight leg raise left: negative    Reflexes   Patellar: normal    Other   Toe walk: normal  Heel walk: normal  Sensation: normal  Gait: normal   Erythema: no back redness            Physical Exam   Constitutional: He is oriented to person, place, and time  He appears well-developed and well-nourished  HENT:   Head: Normocephalic and atraumatic  Eyes: Pupils are equal, round, and reactive to light  Conjunctivae are normal    Neck: Normal range of motion  Neck supple  Cardiovascular: Normal rate and intact distal pulses  Pulmonary/Chest: Effort normal  No respiratory distress  Musculoskeletal:   As noted in HPI   Neurological: He is alert and oriented to person, place, and time  Skin: Skin is warm and dry  Psychiatric: He has a normal mood and affect  His behavior is normal    Vitals reviewed

## 2019-04-01 ENCOUNTER — EVALUATION (OUTPATIENT)
Dept: PHYSICAL THERAPY | Facility: CLINIC | Age: 64
End: 2019-04-01
Payer: COMMERCIAL

## 2019-04-01 DIAGNOSIS — M54.50 ACUTE MIDLINE LOW BACK PAIN WITHOUT SCIATICA: Primary | ICD-10-CM

## 2019-04-01 PROCEDURE — 97161 PT EVAL LOW COMPLEX 20 MIN: CPT

## 2019-04-01 PROCEDURE — G8979 MOBILITY GOAL STATUS: HCPCS

## 2019-04-01 PROCEDURE — G8978 MOBILITY CURRENT STATUS: HCPCS

## 2019-04-03 ENCOUNTER — OFFICE VISIT (OUTPATIENT)
Dept: PHYSICAL THERAPY | Facility: CLINIC | Age: 64
End: 2019-04-03
Payer: COMMERCIAL

## 2019-04-03 DIAGNOSIS — M54.50 ACUTE MIDLINE LOW BACK PAIN WITHOUT SCIATICA: Primary | ICD-10-CM

## 2019-04-03 PROCEDURE — 97110 THERAPEUTIC EXERCISES: CPT

## 2019-04-03 PROCEDURE — 97112 NEUROMUSCULAR REEDUCATION: CPT

## 2019-04-08 ENCOUNTER — OFFICE VISIT (OUTPATIENT)
Dept: PHYSICAL THERAPY | Facility: CLINIC | Age: 64
End: 2019-04-08
Payer: COMMERCIAL

## 2019-04-08 DIAGNOSIS — M54.50 ACUTE MIDLINE LOW BACK PAIN WITHOUT SCIATICA: Primary | ICD-10-CM

## 2019-04-08 PROCEDURE — 97140 MANUAL THERAPY 1/> REGIONS: CPT

## 2019-04-08 PROCEDURE — 97112 NEUROMUSCULAR REEDUCATION: CPT

## 2019-04-10 ENCOUNTER — OFFICE VISIT (OUTPATIENT)
Dept: PHYSICAL THERAPY | Facility: CLINIC | Age: 64
End: 2019-04-10
Payer: COMMERCIAL

## 2019-04-10 DIAGNOSIS — M54.50 ACUTE MIDLINE LOW BACK PAIN WITHOUT SCIATICA: Primary | ICD-10-CM

## 2019-04-10 PROCEDURE — 97112 NEUROMUSCULAR REEDUCATION: CPT

## 2019-04-10 PROCEDURE — 97140 MANUAL THERAPY 1/> REGIONS: CPT

## 2019-04-10 PROCEDURE — 97110 THERAPEUTIC EXERCISES: CPT

## 2019-04-15 ENCOUNTER — OFFICE VISIT (OUTPATIENT)
Dept: PHYSICAL THERAPY | Facility: CLINIC | Age: 64
End: 2019-04-15
Payer: COMMERCIAL

## 2019-04-15 DIAGNOSIS — M54.50 ACUTE MIDLINE LOW BACK PAIN WITHOUT SCIATICA: Primary | ICD-10-CM

## 2019-04-15 PROCEDURE — 97140 MANUAL THERAPY 1/> REGIONS: CPT

## 2019-04-15 PROCEDURE — 97112 NEUROMUSCULAR REEDUCATION: CPT

## 2019-04-15 PROCEDURE — 97110 THERAPEUTIC EXERCISES: CPT

## 2019-04-17 ENCOUNTER — OFFICE VISIT (OUTPATIENT)
Dept: PHYSICAL THERAPY | Facility: CLINIC | Age: 64
End: 2019-04-17
Payer: COMMERCIAL

## 2019-04-17 DIAGNOSIS — M54.50 ACUTE MIDLINE LOW BACK PAIN WITHOUT SCIATICA: Primary | ICD-10-CM

## 2019-04-17 PROCEDURE — 97110 THERAPEUTIC EXERCISES: CPT

## 2019-04-17 PROCEDURE — 97140 MANUAL THERAPY 1/> REGIONS: CPT

## 2019-04-17 PROCEDURE — 97112 NEUROMUSCULAR REEDUCATION: CPT

## 2019-04-22 ENCOUNTER — OFFICE VISIT (OUTPATIENT)
Dept: PHYSICAL THERAPY | Facility: CLINIC | Age: 64
End: 2019-04-22
Payer: COMMERCIAL

## 2019-04-22 DIAGNOSIS — M54.50 ACUTE MIDLINE LOW BACK PAIN WITHOUT SCIATICA: Primary | ICD-10-CM

## 2019-04-22 PROCEDURE — 97110 THERAPEUTIC EXERCISES: CPT

## 2019-04-22 PROCEDURE — G8980 MOBILITY D/C STATUS: HCPCS

## 2019-04-22 PROCEDURE — 97112 NEUROMUSCULAR REEDUCATION: CPT

## 2019-04-22 PROCEDURE — 97140 MANUAL THERAPY 1/> REGIONS: CPT

## 2019-04-22 PROCEDURE — G8979 MOBILITY GOAL STATUS: HCPCS

## 2019-04-24 ENCOUNTER — APPOINTMENT (OUTPATIENT)
Dept: PHYSICAL THERAPY | Facility: CLINIC | Age: 64
End: 2019-04-24
Payer: COMMERCIAL

## 2019-05-21 ENCOUNTER — TELEPHONE (OUTPATIENT)
Dept: CARDIOLOGY CLINIC | Facility: CLINIC | Age: 64
End: 2019-05-21

## 2019-05-21 DIAGNOSIS — I10 ESSENTIAL HYPERTENSION: Primary | ICD-10-CM

## 2019-05-28 RX ORDER — HYDROCHLOROTHIAZIDE 12.5 MG/1
12.5 TABLET ORAL DAILY
Qty: 90 TABLET | Refills: 3 | Status: SHIPPED | OUTPATIENT
Start: 2019-05-28 | End: 2019-10-11

## 2019-05-28 RX ORDER — LOSARTAN POTASSIUM 50 MG/1
50 TABLET ORAL DAILY
Qty: 90 TABLET | Refills: 3 | Status: SHIPPED | OUTPATIENT
Start: 2019-05-28 | End: 2019-10-11

## 2019-06-15 DIAGNOSIS — I10 ESSENTIAL HYPERTENSION: ICD-10-CM

## 2019-06-17 RX ORDER — LOSARTAN POTASSIUM AND HYDROCHLOROTHIAZIDE 12.5; 5 MG/1; MG/1
TABLET ORAL
Qty: 30 TABLET | Refills: 5 | Status: SHIPPED | OUTPATIENT
Start: 2019-06-17 | End: 2019-10-11

## 2019-08-20 ENCOUNTER — TELEPHONE (OUTPATIENT)
Dept: CARDIOLOGY CLINIC | Facility: CLINIC | Age: 64
End: 2019-08-20

## 2019-08-20 DIAGNOSIS — I10 ESSENTIAL HYPERTENSION: Primary | ICD-10-CM

## 2019-08-20 RX ORDER — TELMISARTAN AND HYDROCHLORTHIAZIDE 80; 12.5 MG/1; MG/1
1 TABLET ORAL DAILY
Qty: 30 TABLET | Refills: 5 | Status: SHIPPED | OUTPATIENT
Start: 2019-08-20 | End: 2020-03-09

## 2019-08-23 ENCOUNTER — TELEPHONE (OUTPATIENT)
Dept: CARDIOLOGY CLINIC | Facility: CLINIC | Age: 64
End: 2019-08-23

## 2019-08-23 NOTE — TELEPHONE ENCOUNTER
Patient called and was confused about his medication  I tried to explain that the pharm has Losartan on back order and telmisartan-hydrochlorothiazide (MICARDIS HCT) 80-12 5 MG per tablet was given in substitution  He is concerned starting a new medication  He is also asking for a Rx for Losartan to be sent to the pharm so when they get the medication they can fill it for him

## 2019-08-23 NOTE — TELEPHONE ENCOUNTER
Spoke with patient with equivalent medication alternative: Micardis-HCT  Reassurance given  Patient agreed to start on medication;he will try another pharmacy to see if cheaper than $10 copay and will call us back

## 2019-10-07 NOTE — PROGRESS NOTES
Progress Note - Cardiology Office  [facility]    Titus Bar 71 y o  male MRN: [id number]  : 1955  Encounter: [id number]      Assessment:     1  Essential hypertension    2  Cardiac murmur    3  Dyslipidemia    4  Anxiety        Discussion Summary and Plan:  1  Hypertension  Patient has longstanding history of essential hypertension  Presently well controlled with amlodipine and Micardis HCT  Patient has blood test done 2 months ago  He will send us a copy  Continue same medications  2   Cardiac murmur  Patient has harsh pansystolic murmur which radiates towards axilla  The loudness of murmur is high  It may be related to his thin chest however patient has no recent echo will check echo Doppler for LV function as well as left atrial size and severity of MR     3  Dyslipidemia  Patient is tolerating low-dose statin without any problem  Advised him to send a copy of his blood test    4  Anxiety  Patient was reassured  Advised him to continue current medications  He is pretty active at his job he walked 12,000 steps today  Follow-up in 6 months    Counseling :  A description of the counseling  Goals and Barriers  Patient's ability to self care: Yes  Medication side effect reviewed with patient in detail and all their questions answered to their satisfaction  HPI :     Titus Bar is a 71y o  year old male who came for follow up  Patient has history of hypertension hyperlipidemia benign prostate hypertrophy who was initially seen by me few years ago for hypertension  His cholesterol was previously diet controlled and has been doing well but over the year due to his age now his risk is higher and he is willing to go on statin therapy  He denies any chest pain or any shortness of breath  No fever no chills no PND no orthopnea no other significant complaint  10/11/2019  Above reviewed  Patient came for follow-up  He is doing well    His blood pressure is very well controlled  He is tolerating his medications very well  He does have history of heart murmur which is due to mitral regurgitation  EKG shows normal sinus rhythm with moderate voltage criteria for LVH most likely his thin chest   No fever no chills no nausea no vomiting no PND no orthopnea  About 2 months ago he had a blood test done his primary care doctor which were acceptable no other significant complaint at this time  He is pretty active at his work  But he has to drive 60 miles to go for work  His job involves lot of activities  He walked today more than 12,000 steps  Review of Systems   Constitutional: Negative for activity change, chills, diaphoresis, fever and unexpected weight change  HENT: Negative for congestion  Eyes: Negative for discharge and redness  Respiratory: Negative for cough, chest tightness, shortness of breath and wheezing  Cardiovascular: Negative  Negative for chest pain, palpitations and leg swelling  Gastrointestinal: Negative for abdominal pain, diarrhea and nausea  Endocrine: Negative  Genitourinary: Negative for decreased urine volume and urgency  Musculoskeletal: Negative  Negative for arthralgias, back pain and gait problem  Skin: Negative for rash and wound  Allergic/Immunologic: Negative  Neurological: Negative for dizziness, seizures, syncope, weakness, light-headedness and headaches  Hematological: Negative  Psychiatric/Behavioral: Negative for agitation and confusion  The patient is not nervous/anxious  Historical Information   History reviewed  No pertinent past medical history  History reviewed  No pertinent surgical history    Social History     Substance and Sexual Activity   Alcohol Use Yes    Comment: occ     Social History     Substance and Sexual Activity   Drug Use No     Social History     Tobacco Use   Smoking Status Never Smoker   Smokeless Tobacco Never Used     Family History:   Family History Problem Relation Age of Onset    Kidney failure Father     Hypertension Brother     No Known Problems Mother     No Known Problems Sister     No Known Problems Maternal Aunt     No Known Problems Maternal Uncle     No Known Problems Paternal Aunt     No Known Problems Paternal Uncle     No Known Problems Maternal Grandmother     No Known Problems Maternal Grandfather     No Known Problems Paternal Grandmother     No Known Problems Paternal Grandfather        Meds/Allergies     Allergies   Allergen Reactions    Tadalafil      In error         Current Outpatient Medications:     amLODIPine (NORVASC) 10 mg tablet, Take 1 tablet (10 mg total) by mouth daily, Disp: 90 tablet, Rfl: 3    atorvastatin (LIPITOR) 10 mg tablet, Take 1 tablet (10 mg total) by mouth daily, Disp: 90 tablet, Rfl: 2    calcium carbonate (OS-CLEVELAND) 600 MG tablet, Take 600 mg by mouth 2 (two) times a day with meals, Disp: , Rfl:     glucosamine 500 MG CAPS capsule, Take 500 mg by mouth, Disp: , Rfl:     Menaquinone-7 (VITAMIN K2 PO), Take by mouth, Disp: , Rfl:     tamsulosin (FLOMAX) 0 4 mg, Take 0 8 mg by mouth daily, Disp: , Rfl: 11    telmisartan-hydrochlorothiazide (MICARDIS HCT) 80-12 5 MG per tablet, Take 1 tablet by mouth daily, Disp: 30 tablet, Rfl: 5    Omega-3 Fatty Acids (FISH OIL) 1,000 mg, Take 1,000 mg by mouth daily, Disp: , Rfl:     Vitals: Blood pressure 120/70, pulse 74, height 5' 4" (1 626 m), weight 66 2 kg (146 lb), SpO2 96 %  Body mass index is 25 06 kg/m²  Vitals:    10/11/19 1631   Weight: 66 2 kg (146 lb)     BP Readings from Last 3 Encounters:   10/11/19 120/70   10/09/19 118/72   03/22/19 117/72         Physical Exam   Constitutional: He is oriented to person, place, and time  He appears well-developed and well-nourished  No distress  HENT:   Head: Normocephalic and atraumatic  Eyes: Pupils are equal, round, and reactive to light  Neck: Normal range of motion  Neck supple  No JVD present   No tracheal deviation present  No thyromegaly present  Cardiovascular: Normal rate, regular rhythm, S1 normal, S2 normal and intact distal pulses  Exam reveals no gallop, no S3, no S4, no distant heart sounds and no friction rub  Murmur heard  Systolic (ejection) murmur is present with a grade of 2/6  S1-S2 regular with Harsh pansystolic 3/6 murmur which radiates toward the axilla is present and is slightly increase intensity from past    Pulmonary/Chest: Effort normal and breath sounds normal  No respiratory distress  He has no wheezes  He has no rales  He exhibits no tenderness  Abdominal: Soft  Bowel sounds are normal  He exhibits no distension  There is no tenderness  There is no rebound  Musculoskeletal: Normal range of motion  He exhibits no edema, tenderness or deformity  Neurological: He is alert and oriented to person, place, and time  Skin: Skin is warm and dry  No rash noted  He is not diaphoretic  No pallor  Psychiatric: He has a normal mood and affect  His behavior is normal  Judgment normal         Diagnostic Studies Review Cardio:  Lab Review: Patient has blood test done outside  Cholesterol was 147 triglyceride 52 HDL 60 LDL 76  LFTs are acceptable  Hemoglobin 14 4 hematocrit 45  HbA1c 6 0  LFTs were acceptable  Potassium 3 8 sodium 141  Stress Test: Nuclear stress test in December 2015 shows no ischemia EF was 60%  Echocardiogram/TAMIKA: Echo Doppler in 6612 shows a systolic function, mild MR, and a repeat echo in January 2017 shows normal systolic function with no significant change  Mild MR was noted  ECG Report: Twelve-lead EKG done 4/3/2017 shows normal sinus done no significant   ST changes no change from old EKG     Repeat EKG shows normal sinus rhythm heart rate 58 beats per minute  No significant ST changes  High voltage may be due to thin chest     Twelve lead EKG done in our office 01/21/2019 shows normal sinus rhythm LVH by voltage    No other significant ST changes  Twelve lead EKG 10/11/2019 shows normal sinus rhythm heart rate 81 beats per minute  Cardiac testing:   Results for orders placed during the hospital encounter of 17   Echo complete with contrast if indicated    Narrative [address]  [address] [facility]  [place] [address]  [phone]    Transthoracic Echocardiogram  2D, M-mode, Doppler, and Color Doppler    Study date:  2017    Patient: Lina Méndez  MR number: [id number]  Account number: [de-identified]  : 1955  Age: 57 years  Gender: Male  Status: Routine  Location: Echo lab  Height: 64 in  Weight: 137 7 lb  BP: 154/ 85 mmHg    Indications: HTN, Hyperlipidemia    Diagnoses: I11 9 - Hypertensive heart disease without heart failure    Sonographer:  ELENITA Holley  Primary Physician:  Twila Pallas, MD  Referring Physician:  Jessie Acevedo MD  Group:  [facility]  Interpreting Physician:  Jessie Acevedo MD    SUMMARY    LEFT VENTRICLE:  Systolic function was normal by visual assessment  Ejection fraction was  estimated in the range of 55 % to 60 % to be 55 %  Wall thickness was at the upper limits of normal   Doppler parameters were consistent with abnormal left ventricular relaxation  (grade 1 diastolic dysfunction)  MITRAL VALVE:  There was mild regurgitation  TRICUSPID VALVE:  There was mild regurgitation  Estimated peak PA pressure was 35 mmHg  PULMONIC VALVE:  There was mild regurgitation  HISTORY: PRIOR HISTORY: HTN, Hyperlipidemia    PROCEDURE: The procedure was performed in the echo lab  This was a routine  study  The transthoracic approach was used  The study included complete 2D  imaging, M-mode, complete spectral Doppler, and color Doppler  The heart rate  was 67 bpm, at the start of the study  Images were obtained from the  parasternal, apical, subcostal, and suprasternal notch acoustic windows  Image  quality was good      LEFT VENTRICLE: Size was normal  Systolic function was normal by visual  assessment  Ejection fraction was estimated in the range of 55 % to 60 % to be  55 %  Wall thickness was at the upper limits of normal  DOPPLER: Doppler  parameters were consistent with abnormal left ventricular relaxation (grade 1  diastolic dysfunction)  RIGHT VENTRICLE: The size was normal  Systolic function was normal     LEFT ATRIUM: Size was at the upper limits of normal     RIGHT ATRIUM: Size was normal     MITRAL VALVE: Valve structure was normal  There was normal leaflet separation  DOPPLER: The transmitral velocity was within the normal range  There was no  evidence for stenosis  There was mild regurgitation  AORTIC VALVE: The valve was trileaflet  Leaflets exhibited mildly increased  thickness  DOPPLER: There was no evidence for stenosis  There was no  regurgitation  TRICUSPID VALVE: DOPPLER: There was mild regurgitation  Estimated peak PA  pressure was 35 mmHg  PULMONIC VALVE: Not well visualized  DOPPLER: There was mild regurgitation  PERICARDIUM: There was no thickening or calcification  There was no pericardial  effusion  SYSTEMIC VEINS: IVC: The inferior vena cava was normal in size  Respirophasic  changes were normal     SYSTEM MEASUREMENT TABLES    2D mode  AoR Diam 2D: 3 4 cm  LA Diam (2D): 3 5 cm  LA/Ao (2D): 1 03  FS (2D Teich): 30 1 %  IVSd (2D): 1 13 cm  LVDEV: 69 6 cm³  LVESV: 29 3 cm³  LVIDd(2D): 3 99 cm  LVISd (2D): 2 79 cm  LVOT Area 2D: 3 14 cm squared  LVPWd (2D): 1 21 cm  SV (Teich): 40 3 cm³    Apical four chamber  LVEF A4C: 60 %    Unspecified Scan Mode  SHONA Cont Eq (Peak Anup): 2 19 cm squared  LVOT Diam : 2 cm  LVOT Vmax: 1060 mm/s  LVOT Vmax; Mean: 1060 mm/s  Peak Grad ; Mean: 4 mm[Hg]  MV Peak A Anup: 795 mm/s  MV Peak E Anup   Mean: 597 mm/s  MVA (PHT): 3 44 cm squared  PHT: 64 ms  Max P mm[Hg]  V Max: 2610 mm/s  Vmax: 2640 mm/s  RA Area: 20 2 cm squared  RA Volume: 58 4 cm³  TAPSE: 2 1 cm    [facility]    Prepared and electronically signed by    Mike Mcintosh MD  Signed 07-Jan-2017 20:52:49       Lab Review  Labs requested    Dr Mike Mcintosh MD [facility]      "This note has been constructed using a voice recognition system  Therefore there may be syntax, spelling, and/or grammatical errors   Please call if you have any questions  "

## 2019-10-09 ENCOUNTER — OFFICE VISIT (OUTPATIENT)
Dept: OBGYN CLINIC | Facility: CLINIC | Age: 64
End: 2019-10-09
Payer: COMMERCIAL

## 2019-10-09 ENCOUNTER — APPOINTMENT (OUTPATIENT)
Dept: RADIOLOGY | Facility: CLINIC | Age: 64
End: 2019-10-09
Payer: COMMERCIAL

## 2019-10-09 VITALS
BODY MASS INDEX: 24.65 KG/M2 | SYSTOLIC BLOOD PRESSURE: 118 MMHG | WEIGHT: 144.4 LBS | DIASTOLIC BLOOD PRESSURE: 72 MMHG | HEIGHT: 64 IN | HEART RATE: 74 BPM

## 2019-10-09 DIAGNOSIS — M75.02 ADHESIVE CAPSULITIS OF LEFT SHOULDER: Primary | ICD-10-CM

## 2019-10-09 DIAGNOSIS — M25.512 LEFT SHOULDER PAIN, UNSPECIFIED CHRONICITY: ICD-10-CM

## 2019-10-09 PROCEDURE — 99213 OFFICE O/P EST LOW 20 MIN: CPT | Performed by: ORTHOPAEDIC SURGERY

## 2019-10-09 PROCEDURE — 73030 X-RAY EXAM OF SHOULDER: CPT

## 2019-10-09 RX ORDER — TAMSULOSIN HYDROCHLORIDE 0.4 MG/1
0.8 CAPSULE ORAL DAILY
Refills: 11 | COMMUNITY
Start: 2019-09-10 | End: 2022-07-28

## 2019-10-09 NOTE — PATIENT INSTRUCTIONS
Adhesive Capsulitis   WHAT YOU NEED TO KNOW:   What is adhesive capsulitis? Adhesive capsulitis happens when tissues in your shoulder tighten and swell  The condition is often called frozen shoulder because the swollen tissues cause pain and decrease your shoulder movement  What causes adhesive capsulitis? The cause of your adhesive capsulitis may not be known  The condition may be related to a previous injury or surgery  You may be more likely to develop adhesive capsulitis if:  · You are aged 36 or older  · You are female  · You are not able to do physical activity  · You have medical conditions, such as diabetes, thyroid disease, or heart or lung disease  What are the signs and symptoms of adhesive capsulitis? Adhesive capsulitis may last from several months to years before it gets better on its own  You can have adhesive capsulitis in one or both shoulders  The condition has 3 stages:  · Stage 1: This is called the freezing or painful stage and may last 2 to 6 months  You may have increasing pain and stiffness  You may have pain with movement and at rest  The pain is often worse at night  · Stage 2: This is called the adhesive stage and may last 4 to 6 months  You may have less pain  You may still have pain when you move your arm to reach  Your shoulder may still be stiff, and you may not be able to move your shoulder much  · Stage 3: This is the recovery stage and may last from 1 month to more than 3 years  Your shoulder movement may slowly start to get better  You may also begin to have less pain  How is adhesive capsulitis diagnosed? Your healthcare provider will do an exam  He will check your neck and shoulder  He will check how your shoulder moves and how strong it is  He may move your arm in different positions while you stand or lie down  You may also need the following:  · Plain x-ray: This test takes pictures of the bones and tissues inside your shoulder   An x-ray may show if your shoulder pain and stiffness is from another medical problem  · A joint x-ray  is a picture of the bones and tissues in your joints  You may be given contrast liquid to help the pictures show up better  Tell a healthcare provider if you have ever had an allergic reaction to contrast liquid  · Magnetic resonance imaging: This test is called an MRI  During the MRI, pictures are taken of the bones and tissues inside your shoulder  An MRI may show if your shoulder joint capsule has narrowed  You will need to lie still during this test  Never enter the MRI room with any metal objects  This can cause serious injury  How is adhesive capsulitis treated? The goal of treatment is to help you regain as much shoulder movement as possible  Treatment will depend on what stage you are in  Ask your healthcare provider about these and other treatments for adhesive capsulitis:  · Medicines:      ¨ Pain medicine: You may be given medicine to take away or decrease pain  Do not wait until the pain is severe before you take your medicine  ¨ NSAIDs:  This group of medicine includes aspirin and ibuprofen  It helps decrease pain  This medicine can be bought without a doctor's order  Always read the medicine label and follow the directions  NSAIDs can cause stomach bleeding or kidney problems if they are not taken correctly  ¨ Steroids: This medicine helps decrease pain and swelling  Healthcare providers may give this medicine as a shot into your shoulder  · Physical therapy:  A physical therapist teaches you exercises to help improve movement and strength, and to decrease pain  · Manipulation under anesthesia:  You are given medicine that makes you sleep  Your shoulder is then gently moved by your healthcare provider  Manipulation releases tightness in your shoulder and improves movement  This procedure may be done if other treatments do not help      · Surgery:  Tissues in your shoulder may be cut to release the tightness  During surgery, swollen or damaged tissue may also be removed  Surgery may be needed if other treatments do not help  How can I help manage my adhesive capsulitis? · Stretches: Your healthcare provider may suggest stretches to help improve your symptoms  Ask your healthcare provider or your physical therapist which stretches are best and how to do them  The following stretches may be suggested:    ¨ Doorway stretch:   a doorway with your painful arm bent at the elbow  Place your hand on the door frame and turn your body away from the door frame  Hold this position for 30 seconds  Relax and repeat  ¨ Forward stretch:  Lie on your back with your legs straight out  Use your healthy arm to push your painful arm up over your head until you feel a gentle stretch  Hold this position for 15 seconds  Slowly lower your arm to the starting position  Relax and repeat  ¨ Crossover stretch:  Use your healthy arm to gently pull your painful arm across your chest just below your chin  Pull until you feel a gentle stretch  Hold this position for 30 seconds  Relax and repeat  · Ice or heat: Ice or heat on your shoulder may help decrease your pain and improve shoulder movement  Apply ice to help ease pain after stretching  Put heat on your shoulder to help relax your muscles  Use ice or heat as directed  When should I contact my healthcare provider? Contact your healthcare provider if:  · You have worse pain and stiffness in your shoulder  · You have questions or concerns about your condition or care  When should I seek immediate care? Seek care immediately if:  · You have new or more trouble moving your arm  CARE AGREEMENT:   You have the right to help plan your care  Learn about your health condition and how it may be treated  Discuss treatment options with your caregivers to decide what care you want to receive  You always have the right to refuse treatment   The above information is an  only  It is not intended as medical advice for individual conditions or treatments  Talk to your doctor, nurse or pharmacist before following any medical regimen to see if it is safe and effective for you  © 2017 [employer] Information is for End User's use only and may not be sold, redistributed or otherwise used for commercial purposes  All illustrations and images included in CareNotes® are the copyrighted property of [employer]  or [employer]

## 2019-10-09 NOTE — PROGRESS NOTES
Assessment/Plan:  1  Adhesive capsulitis of left shoulder  Ambulatory referral to Physical Therapy   2  Left shoulder pain, unspecified chronicity  XR shoulder 2+ vw left       Scribe Attestation    I,:   Barak Rosalba am acting as a scribe while in the presence of the attending physician :        I,:   Mekhi Godinez MD personally performed the services described in this documentation    as scribed in my presence :          Upon review of imaging and physical examination, I believe he is demonstrating signs and symptoms consistent with left shoulder adhesive capsulitis  I do believe we can treat this conservatively at this time in the form of formal physical therapy  I provided him with a prescription for this today in the office  I explained to him if he is not feeling better within 6 weeks we may consider a cortisone injection, however he has declined this offer  We also discussed a possible manipulation under anesthesia if he does not increase range of motion with formal physical therapy  I will have him follow up back in the office in 6 weeks time for repeat clinical evaluation  At that time, if he is not progressing, we may consider an MRI to assess for a possible rotator cuff tear  He also has a likely 1x1 lipoma on the posterior aspect of his proximal humerus that is soft, mobile, superficial and nontender  Subjective:   Johanny Arnett is a 71 y o  male who presents to the office today for initial evaluation of left shoulder pain, with no mechanism of injury  He states he has noticed pain about his left shoulder 3 months ago that has remained the same since then  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 and believes he has been overusing his shoulders  At today's visit, he localizes the majority of his pain and superior aspect of his shoulder  He describes his pain as more stiff, sore and achy  He notes he is attempted ice and he which has helped on occasion    However, he will wake up with increased pain about his shoulder  He denies any radicular symptoms  He denies any numbness and tingling  Review of Systems   Constitutional: Positive for activity change  Negative for chills, fever and unexpected weight change  HENT: Negative for hearing loss, nosebleeds and sore throat  Eyes: Negative for pain, redness and visual disturbance  Respiratory: Negative for cough, shortness of breath and wheezing  Cardiovascular: Negative for chest pain, palpitations and leg swelling  Gastrointestinal: Negative for abdominal pain, nausea and vomiting  Endocrine: Negative for polyphagia and polyuria  Genitourinary: Negative for dysuria and hematuria  Musculoskeletal: Positive for myalgias  See HPI   Skin: Negative for rash and wound  Neurological: Negative for dizziness, numbness and headaches  Psychiatric/Behavioral: Negative for decreased concentration and suicidal ideas  The patient is not nervous/anxious  History reviewed  No pertinent past medical history  History reviewed  No pertinent surgical history      Family History   Problem Relation Age of Onset    Kidney failure Father     Hypertension Brother     No Known Problems Mother     No Known Problems Sister     No Known Problems Maternal Aunt     No Known Problems Maternal Uncle     No Known Problems Paternal Aunt     No Known Problems Paternal Uncle     No Known Problems Maternal Grandmother     No Known Problems Maternal Grandfather     No Known Problems Paternal Grandmother     No Known Problems Paternal Grandfather        Social History     Occupational History    Not on file   Tobacco Use    Smoking status: Never Smoker    Smokeless tobacco: Never Used   Substance and Sexual Activity    Alcohol use: Yes     Comment: occ    Drug use: No    Sexual activity: Not on file         Current Outpatient Medications:     amLODIPine (NORVASC) 10 mg tablet, Take 1 tablet (10 mg total) by mouth daily, Disp: 90 tablet, Rfl: 3    atorvastatin (LIPITOR) 10 mg tablet, Take 1 tablet (10 mg total) by mouth daily, Disp: 90 tablet, Rfl: 2    calcium carbonate (OS-CLEVELAND) 600 MG tablet, Take 600 mg by mouth 2 (two) times a day with meals, Disp: , Rfl:     glucosamine 500 MG CAPS capsule, Take 500 mg by mouth, Disp: , Rfl:     Menaquinone-7 (VITAMIN K2 PO), Take by mouth, Disp: , Rfl:     telmisartan-hydrochlorothiazide (MICARDIS HCT) 80-12 5 MG per tablet, Take 1 tablet by mouth daily, Disp: 30 tablet, Rfl: 5    hydrochlorothiazide (HYDRODIURIL) 12 5 mg tablet, Take 1 tablet (12 5 mg total) by mouth daily (Patient not taking: Reported on 10/9/2019), Disp: 90 tablet, Rfl: 3    losartan (COZAAR) 50 mg tablet, Take 1 tablet (50 mg total) by mouth daily (Patient not taking: Reported on 10/9/2019), Disp: 90 tablet, Rfl: 3    losartan-hydrochlorothiazide (HYZAAR) 50-12 5 mg per tablet, TAKE 1 TABLET BY MOUTH ONCE DAILY (Patient not taking: Reported on 10/9/2019), Disp: 30 tablet, Rfl: 5    Omega-3 Fatty Acids (FISH OIL) 1,000 mg, Take 1,000 mg by mouth daily, Disp: , Rfl:     tamsulosin (FLOMAX) 0 4 mg, Take 0 8 mg by mouth daily, Disp: , Rfl: 11    Allergies   Allergen Reactions    Tadalafil        Objective:  Vitals:    10/09/19 1346   BP: 118/72   Pulse: 74       Left Shoulder Exam     Tenderness   Left shoulder tenderness location: upper trapezius  Range of Motion   Active abduction: 80   External rotation: 20   Forward flexion: 90   Internal rotation 90 degrees: 0     Muscle Strength   Abduction: 4/5   Internal rotation: 5/5   External rotation: 5/5     Tests   Bruno test: negative  Impingement: negative  Drop arm: positive    Other   Erythema: absent  Sensation: normal  Pulse: present (2+ radial)     Comments:    Empty can positive    likely 1x1 lipoma on the posterior aspect of his proximal humerus that is soft, mobile, superficial and nontender                Physical Exam   Constitutional: He is oriented to person, place, and time  He appears well-developed and well-nourished  HENT:   Head: Normocephalic and atraumatic  Eyes: Pupils are equal, round, and reactive to light  Conjunctivae are normal    Neck: Normal range of motion  Neck supple  Cardiovascular: Normal rate and intact distal pulses  Pulmonary/Chest: Effort normal  No respiratory distress  Musculoskeletal:   As noted in HPI   Neurological: He is alert and oriented to person, place, and time  Skin: Skin is warm and dry  Psychiatric: He has a normal mood and affect  His behavior is normal    Nursing note and vitals reviewed  I have personally reviewed pertinent films in PACS and my interpretation is as follows:  X-rays of the left shoulder obtained on 10/09/2019 demonstrate no acute fracture, dislocation or osseous abnormality

## 2019-10-11 ENCOUNTER — OFFICE VISIT (OUTPATIENT)
Dept: CARDIOLOGY CLINIC | Facility: CLINIC | Age: 64
End: 2019-10-11
Payer: COMMERCIAL

## 2019-10-11 VITALS
OXYGEN SATURATION: 96 % | SYSTOLIC BLOOD PRESSURE: 120 MMHG | HEART RATE: 74 BPM | DIASTOLIC BLOOD PRESSURE: 70 MMHG | BODY MASS INDEX: 24.92 KG/M2 | HEIGHT: 64 IN | WEIGHT: 146 LBS

## 2019-10-11 DIAGNOSIS — I10 ESSENTIAL HYPERTENSION: ICD-10-CM

## 2019-10-11 DIAGNOSIS — E78.5 DYSLIPIDEMIA: ICD-10-CM

## 2019-10-11 DIAGNOSIS — R01.1 CARDIAC MURMUR: ICD-10-CM

## 2019-10-11 DIAGNOSIS — F41.9 ANXIETY: ICD-10-CM

## 2019-10-11 PROCEDURE — 3074F SYST BP LT 130 MM HG: CPT | Performed by: INTERNAL MEDICINE

## 2019-10-11 PROCEDURE — 3078F DIAST BP <80 MM HG: CPT | Performed by: INTERNAL MEDICINE

## 2019-10-11 PROCEDURE — 99214 OFFICE O/P EST MOD 30 MIN: CPT | Performed by: INTERNAL MEDICINE

## 2019-10-11 PROCEDURE — 93000 ELECTROCARDIOGRAM COMPLETE: CPT | Performed by: INTERNAL MEDICINE

## 2019-10-21 ENCOUNTER — EVALUATION (OUTPATIENT)
Dept: PHYSICAL THERAPY | Facility: CLINIC | Age: 64
End: 2019-10-21
Payer: COMMERCIAL

## 2019-10-21 DIAGNOSIS — M75.02 ADHESIVE CAPSULITIS OF LEFT SHOULDER: Primary | ICD-10-CM

## 2019-10-21 PROCEDURE — 97161 PT EVAL LOW COMPLEX 20 MIN: CPT

## 2019-10-21 NOTE — PROGRESS NOTES
PT Evaluation     Today's date: 10/21/2019  Patient name: Martha Autsin  : 1955  MRN: [id number]  Referring provider: Jared Morse MD  Dx:   Encounter Diagnosis     ICD-10-CM    1  Adhesive capsulitis of left shoulder M75 02 Ambulatory referral to Physical Therapy                  Assessment  Assessment details: Martha Austin is a 71 y o  male who presents with pain, decreased strength, decreased ROM and decreased joint mobility  Due to these impairments, patient has difficulty performing ADL's, work-related activities, lifting/carrying, reaching  Patient's clinical presentation is consistent with their referring diagnosis of Adhesive capsulitis of left shoulder  (primary encounter diagnosis)  Plan: Ambulatory referral to Physical Therapy    Patient has been educated in home exercise program and plan of care  Patient would benefit from skilled physical therapy services to address their aforementioned functional limitations and progress towards prior level of function and independence with home exercise program      Impairments: abnormal muscle firing, abnormal or restricted ROM, activity intolerance, impaired physical strength, lacks appropriate home exercise program and pain with function  Understanding of Dx/Px/POC: good   Prognosis: good    Goals  Short Term Goals: Target Date 30 days  1  Initiate and advance HEP  2  Decrease c/o pain to 4/10 at worst  3  Increase ROM to WNL  4  Increase left shoulder strength by 1 grade    Long Term Goals: Target Date 60 days  1  Indep with HEP  2  Abolish c/o pain   3   Increase Left shoulder strength to [address]  Patient would benefit from: skilled PT  Planned modality interventions: cryotherapy, electrical stimulation/Russian stimulation and thermotherapy: hydrocollator packs  Planned therapy interventions: joint mobilization, manual therapy, patient education, postural training, activity modification, abdominal trunk stabilization, body mechanics training, flexibility, functional ROM exercises, graded exercise, home exercise program, neuromuscular re-education, strengthening, stretching, therapeutic activities, therapeutic exercise, motor coordination training, muscle pump exercises, gait training, balance/weight bearing training and ADL training  Frequency: 2x week  Treatment plan discussed with: patient        Subjective Evaluation    History of Present Illness  Mechanism of injury: Pt reports about 90 days ago left shoulder pain began to increase 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" "maybe hit something"          Not a recurrent problem   Quality of life: good    Pain  Current pain ratin  At best pain ratin  At worst pain ratin  Location: Posterior Left Shoulder  Quality: dull ache  Relieving factors: heat  Aggravating factors: overhead activity and lifting  Progression: improved    Social Support    Employment status: working  Hand dominance: right    Patient Goals  Patient goals for therapy: decreased pain  Patient goal: to not have any more pain        Objective    Left Shoulder A/PROM    Flexion  90/95*  Abduction 30/45*  Int Rot  50/60*  Ext Rot 10/15*    Shoulder MMT   Left  Right  Flexion   3+/5* 4-/5  Abduction  3+/5* 4-/5  Int Rot   3+/5 4-/5  Ext Rot  3+/5 4-/5  * Denotes Pain       Precautions: Standard

## 2019-10-22 ENCOUNTER — OFFICE VISIT (OUTPATIENT)
Dept: PHYSICAL THERAPY | Facility: CLINIC | Age: 64
End: 2019-10-22
Payer: COMMERCIAL

## 2019-10-22 DIAGNOSIS — M75.02 ADHESIVE CAPSULITIS OF LEFT SHOULDER: Primary | ICD-10-CM

## 2019-10-22 PROCEDURE — 97112 NEUROMUSCULAR REEDUCATION: CPT

## 2019-10-22 PROCEDURE — 97140 MANUAL THERAPY 1/> REGIONS: CPT

## 2019-10-22 PROCEDURE — 97110 THERAPEUTIC EXERCISES: CPT

## 2019-10-22 NOTE — PROGRESS NOTES
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  Reports inc pain and limited mobility  Objective: See treatment diary below      Assessment: Tolerated treatment well  Patient would benefit from continued PT  Pt demo good tolerance, limited ROM throughout  Plan: Continue per plan of care        Precautions: Standard      Manual   10/22           DTM and TPR  3 min            shoulder mobs  2 min            MWM for shoulder scaption/flexion                                                Exercise Diary   2           UT and LS stretching             PROM  5 min           Wall slides  x20           Pulleys   x20           IR/ER isos  x10 ea           Alternating isometrics              Rows/ext NV           Horizontal abd w/ scap sq             Posture work              AAROM shld   flex/ER x20           Pendulums  20x ea 4 way                                                                                                                                              Modalities  2           heat  5 min pre seated           Ice

## 2019-10-28 ENCOUNTER — OFFICE VISIT (OUTPATIENT)
Dept: PHYSICAL THERAPY | Facility: CLINIC | Age: 64
End: 2019-10-28
Payer: COMMERCIAL

## 2019-10-28 DIAGNOSIS — M75.02 ADHESIVE CAPSULITIS OF LEFT SHOULDER: Primary | ICD-10-CM

## 2019-10-28 PROCEDURE — 97110 THERAPEUTIC EXERCISES: CPT

## 2019-10-28 PROCEDURE — 97112 NEUROMUSCULAR REEDUCATION: CPT

## 2019-10-28 NOTE — PROGRESS NOTES
Daily Note     Today's date: 10/28/2019  Patient name: Keyona Houston  : 1955  MRN: [id number]  Referring provider: Heriberto Brizuela MD  Dx:   Encounter Diagnosis     ICD-10-CM    1  Adhesive capsulitis of left shoulder M75 02                   Subjective: Pt without new c/o    Objective: See treatment diary below    Assessment: Tolerated treatment well  Patient would benefit from continued PT    Plan: Continue per plan of care        Precautions: Standard     There ex: 10 min  Rows 10lbs 2 x 10  Lats 20lbs 2 x 10  Pulleys 10 x 10 sec    Neuro marisol/ Manaul: 20 min  Scapular PNF   IR/ER RS with manual resist  Combo of isotonics ER  RS at 100 deg elevation  Gr 2-3 G-H t mobs f/b PROM to increase flexion/ ER

## 2019-10-30 ENCOUNTER — OFFICE VISIT (OUTPATIENT)
Dept: PHYSICAL THERAPY | Facility: CLINIC | Age: 64
End: 2019-10-30
Payer: COMMERCIAL

## 2019-10-30 DIAGNOSIS — M75.02 ADHESIVE CAPSULITIS OF LEFT SHOULDER: Primary | ICD-10-CM

## 2019-10-30 PROCEDURE — 97112 NEUROMUSCULAR REEDUCATION: CPT

## 2019-10-30 PROCEDURE — 97110 THERAPEUTIC EXERCISES: CPT

## 2019-10-30 NOTE — PROGRESS NOTES
Daily Note     Today's date: 10/30/2019  Patient name: Keyona Houston  : 1955  MRN: [id number]  Referring provider: Heriberto Brizuela MD  Dx:   Encounter Diagnosis     ICD-10-CM    1  Adhesive capsulitis of left shoulder M75 02        Start Time: 1730  Stop Time:   Total time in clinic (min): 45 minutes    Subjective: 6-7/10 pain left shldr; has trouble reaching overhead       Objective: See treatment diary below    Precautions: Standard     There ex: 10 min  Rows 10lbs 2 x 10  Lats 20lbs 2 x 10  Pulleys 10 x 10 sec    Neuro marisol/ Manaul: 20 min  Scapular PNF   IR/ER RS with manual resist  Combo of isotonics ER  RS at 100 deg elevation  ER w cane supine @ neutral abd x 10   Gr 2-3 G-H t mobs f/b PROM to increase flexion/ ER - 10-30-19 - PROM for flex/ER /STM left UT/LS     Assessment: Tolerated treatment well  Patient would benefit from continued PT; patient demonstrated limited & painful flexion & ER; added ER @ neutral abd in supine to home program       Plan: Continue per plan of care

## 2019-11-04 ENCOUNTER — APPOINTMENT (OUTPATIENT)
Dept: PHYSICAL THERAPY | Facility: CLINIC | Age: 64
End: 2019-11-04
Payer: COMMERCIAL

## 2019-11-06 ENCOUNTER — OFFICE VISIT (OUTPATIENT)
Dept: PHYSICAL THERAPY | Facility: CLINIC | Age: 64
End: 2019-11-06
Payer: COMMERCIAL

## 2019-11-06 DIAGNOSIS — M75.02 ADHESIVE CAPSULITIS OF LEFT SHOULDER: Primary | ICD-10-CM

## 2019-11-06 PROCEDURE — 97110 THERAPEUTIC EXERCISES: CPT

## 2019-11-06 PROCEDURE — 97112 NEUROMUSCULAR REEDUCATION: CPT

## 2019-11-06 NOTE — PROGRESS NOTES
Daily Note     Today's date: 2019  Patient name: Mayela Eduardo  : 1955  MRN: [id number]  Referring provider: Ladarius Corral MD  Dx:   Encounter Diagnosis     ICD-10-CM    1  Adhesive capsulitis of left shoulder M75 02        Start Time: 0500  Stop Time: 1735  Total time in clinic (min): 50 minutes    Subjective: Pt reports 3-4/10 pain in L shld prior to session, He also reports pain in R shld which can be 7/10 pain at times  He reports improvement with ROM in L shld  Objective: See treatment diary below    Precautions: Standard     There ex: 20 min  Active w/u UBE 5'/5'  Rows 10lbs 2 x 10  Lats 20lbs 2 x 10  Pulleys 10 x 10 sec  Shld ext str with stick 10x ea  Prone rows/ext 7#/4# x20      Neuro marisol/ Manual: 25 min  PNF D1/D2 in supine 20x   IR/ER RS with manual resist  RS at 100 deg elevation  ER w cane supine @ neutral abd x 10  PROM L shld         Assessment: Tolerated treatment well  Patient would benefit from continued PT; restrictions with ER, pt tolerated all exercises well  Plan: Continue per plan of care

## 2019-11-11 ENCOUNTER — TELEPHONE (OUTPATIENT)
Dept: CARDIOLOGY CLINIC | Facility: CLINIC | Age: 64
End: 2019-11-11

## 2019-11-11 ENCOUNTER — HOSPITAL ENCOUNTER (OUTPATIENT)
Dept: NON INVASIVE DIAGNOSTICS | Facility: HOSPITAL | Age: 64
Discharge: HOME | End: 2019-11-11
Attending: INTERNAL MEDICINE
Payer: COMMERCIAL

## 2019-11-11 DIAGNOSIS — R01.1 CARDIAC MURMUR: ICD-10-CM

## 2019-11-11 DIAGNOSIS — I10 ESSENTIAL HYPERTENSION: ICD-10-CM

## 2019-11-11 PROCEDURE — 93306 TTE W/DOPPLER COMPLETE: CPT | Performed by: INTERNAL MEDICINE

## 2019-11-11 PROCEDURE — 93306 TTE W/DOPPLER COMPLETE: CPT

## 2019-11-11 NOTE — TELEPHONE ENCOUNTER
----- Message from Laura Pierre MD sent at 11/11/2019  2:27 PM EST -----  Patient's echo shows patient has more mitral regurgitation now  It is about moderate  Will observe it

## 2019-11-12 ENCOUNTER — OFFICE VISIT (OUTPATIENT)
Dept: PHYSICAL THERAPY | Facility: CLINIC | Age: 64
End: 2019-11-12
Payer: COMMERCIAL

## 2019-11-12 DIAGNOSIS — M75.02 ADHESIVE CAPSULITIS OF LEFT SHOULDER: Primary | ICD-10-CM

## 2019-11-12 PROCEDURE — 97112 NEUROMUSCULAR REEDUCATION: CPT

## 2019-11-12 PROCEDURE — 97110 THERAPEUTIC EXERCISES: CPT

## 2019-11-12 NOTE — PROGRESS NOTES
Daily Note     Today's date: 2019  Patient name: Swati Ornelas  : 1955  MRN: [id number]  Referring provider: Mikki Kaur MD  Dx:   Encounter Diagnosis     ICD-10-CM    1  Adhesive capsulitis of left shoulder M75 02        Start Time:   Stop Time: 173  Total time in clinic (min): 45 minutes    Subjective: Pt reports 3-4/10 pain in L shld  He reports he is limited with shld abd/ER  Objective: See treatment diary below    Precautions: Standard     There ex: 20 min  Active w/u UBE 5'/5'  Rows 10lbs 2 x 10  Lats 20lbs 2 x 10  Pulleys 10 x 10 sec  Shld ext str with stick 10x ea  Prone rows/ext 7#/4# x20      Neuro marisol/ Manual: 25 min  ER w cane supine @ neutral abd x 10  PROM L shld         Assessment: Tolerated treatment well  Patient would benefit from continued PT; restrictions with L shld ER/abd  Pt reported his L shld has been "feeling good"  His main complaint is R shld pain  Plan: Continue per plan of care

## 2019-11-13 ENCOUNTER — APPOINTMENT (OUTPATIENT)
Dept: PHYSICAL THERAPY | Facility: CLINIC | Age: 64
End: 2019-11-13
Payer: COMMERCIAL

## 2019-11-14 ENCOUNTER — OFFICE VISIT (OUTPATIENT)
Dept: PHYSICAL THERAPY | Facility: CLINIC | Age: 64
End: 2019-11-14
Payer: COMMERCIAL

## 2019-11-14 DIAGNOSIS — M75.02 ADHESIVE CAPSULITIS OF LEFT SHOULDER: Primary | ICD-10-CM

## 2019-11-14 PROCEDURE — 97110 THERAPEUTIC EXERCISES: CPT

## 2019-11-14 PROCEDURE — 97112 NEUROMUSCULAR REEDUCATION: CPT

## 2019-11-14 NOTE — PROGRESS NOTES
Daily Note     Today's date: 2019  Patient name: Jessica Mathur  : 1955  MRN: [id number]  Referring provider: Kaye Posey MD  Dx:   Encounter Diagnosis     ICD-10-CM    1  Adhesive capsulitis of left shoulder M75 02                   Subjective: Pt reports that pain is worse in the morning, gets better throughout the day  Feels that PT has helped but sx are still present  End ranges of overhead motion are difficult  Objective: MWM for R side scaption improved overhead ROM, no positive effect on L side  Pt overhead motion does improve slightly when cued for MT activation but results do not carry through entire session  Precautions: Standard     There ex: 20 min  Active w/u UBE 5'/5'  Pulleys x 5 min  Wall slides x 10  Wall slides w/ lift off x 10  YTB overhead press for LT activation 2x10  YTB B ER w/ overhead press x 15-20 total  Supine GTB B horizontal abd w/ scap sq x 20       Neuro marisol/ Manual: 20 min  MWM for scaption B  x10-15 total each  Post GH joint mobs grade III x 2-3 mins B   PROM x 4-5 mins       Assessment: Tolerated treatment well  Patient demonstrated fatigue post treatment and would benefit from continued PT  Fatigue and soreness but similar sx by end of session  Pt benefits intermittently from hands on techniques  Primary PT to reassess at next session  Plan: Continue per plan of care   Progress note per primary PT

## 2019-11-18 ENCOUNTER — OFFICE VISIT (OUTPATIENT)
Dept: PHYSICAL THERAPY | Facility: CLINIC | Age: 64
End: 2019-11-18
Payer: COMMERCIAL

## 2019-11-18 DIAGNOSIS — M75.02 ADHESIVE CAPSULITIS OF LEFT SHOULDER: Primary | ICD-10-CM

## 2019-11-18 PROCEDURE — 97110 THERAPEUTIC EXERCISES: CPT

## 2019-11-18 PROCEDURE — 97112 NEUROMUSCULAR REEDUCATION: CPT

## 2019-11-18 PROCEDURE — 97140 MANUAL THERAPY 1/> REGIONS: CPT

## 2019-11-18 NOTE — PROGRESS NOTES
Progress Note     Today's date: 2019  Patient name: Titus Bar  : 1955  MRN: [id number]  Referring provider: Carmen Loving MD  Dx:   Encounter Diagnosis     ICD-10-CM    1  Adhesive capsulitis of left shoulder M75 02                   Subjective:  Pt reports that his motion is improving, strength is improving, but now R shoulder hurts more than the L  Felt good after last session  Notices that pain is slowly improving but that he still hurts in the morning  R shoulder pain is more intense in anterior portion of shoulder, points ot R mid portion of LHOB tendon  Functional status is below:     Goals  Short Term Goals: Target Date 30 days  1  Initiate and advance HEP - achieved   2  Decrease c/o pain to 4/10 at worst - no progress   3  Increase ROM to WNL - progressed  4  Increase left shoulder strength by 1 grade - progressed     Long Term Goals: Target Date 60 days  1  Indep with HEP - progressed   2  Abolish c/o pain - progressed   3  Increase Left shoulder strength to [facility] - progressed     New Goals :  Short term goals (3 weeks):  1) Pt will further improve L shoulder ROM by 25% pain free  2) Pt will further improve B LE and core strength deficits by 1/3 grade MMT  3) Pt will improve pain at worse to <4/10  4) Pt will progress HEP w/ special emphasis on functional shoulder stability and overhead ROM  Long term goals (6 weeks)  1) Pt will improve FOTO to at least 69   2) Pt will perform full day of work related activities w/ improved postural awareness and shoulder stability, pain <3/10  3) Pt will sleep through the night in positioning of choosing 6/7 nights a week w/o waking due to pain  4) Pt will be independent and compliant w/ HEP in order to maximize functional benefit of skilled PT following d/c           Pain  Current pain rating: Right 6, Left 3-4  At best pain ratin  At worst pain ratin  Location: Posterior Left Shoulder  Quality: dull ache  Relieving factors: heat  Aggravating factors: overhead activity and lifting  Progression: min improved on average since IE    Social Support    Employment status: working  Hand dominance: right    Patient Goals  Patient goals for therapy: decreased pain  Patient goal: to not have any more pain        Objective  IE  Left Shoulder A/PROM    Flexion  90/95*  Abduction 30/45*  Int Rot  50/60*  Ext Rot 10/15*    Shoulder MMT   Left  Right  Flexion   3+/5* 4-/5  Abduction  3+/5* 4-/5  Int Rot   3+/5 4-/5  Ext Rot  3+/5 4-/5  * Denotes Pain    PN 11/18  Left Shoulder A/PROM    Flexion  130/95*  Abduction 90/45*  Int Rot  Transverse process of L5/60*  Ext Rot C5 w/ min head turn/30-35*    Shoulder MMT   Left  Right  Flexion   4-/5* 4/5*  Abduction  4-/5* 4/5*  Int Rot   4-/5 4/5  Ext Rot  4-/5 4/5  * Denotes Pain    R shoulder ROM is WNL but is painful at end ranges of overhead motions flex>scap    Precautions: Standard     There ex: 20 min  Active w/u UBE 5'/5'  Pulleys x 3 min  YTB overhead press for LT activation 2x10  Review of rows/ext  FOTO, ROM, MMT assessment x 10 min  Supine alt isos x 30 sec B  GTB horizontal abd w/ scap sq x 20 total       Neuro marisol/ Manual: 20 min  PROM x 10 mins total between B shoulder  Post GH joint mobs grade II/III x 3-4 mins total  CFM to R LHOB x 2-3 mins     Assessment: Tolerated treatment well  Patient demonstrated fatigue post treatment and would benefit from continued PT  Pt has been evaluated following 9 total skilled therapy visits  In that time, pt has done well to improve his overhead functional ROM, strength, and functional use of B UE  Pain is grossly similar to IE, however it takes longer for more intense pain to come one  Intense pain lasts for shorter total durations  He is scheduled for two more visits prior to MD follow up, we will continue until at least MD check in and assess utility of PT moving forward following this   I did not perform IE or first few sessions, but in my opinion he has made good objective progress to date  He offers no new complaints to end session  Plan: Continue per plan of care   post chain activation as able, prone work, PNF patterns

## 2019-11-20 ENCOUNTER — TELEPHONE (OUTPATIENT)
Dept: CARDIOLOGY CLINIC | Facility: CLINIC | Age: 64
End: 2019-11-20

## 2019-11-21 ENCOUNTER — APPOINTMENT (OUTPATIENT)
Dept: PHYSICAL THERAPY | Facility: CLINIC | Age: 64
End: 2019-11-21
Payer: COMMERCIAL

## 2019-11-25 ENCOUNTER — APPOINTMENT (OUTPATIENT)
Dept: PHYSICAL THERAPY | Facility: CLINIC | Age: 64
End: 2019-11-25
Payer: COMMERCIAL

## 2019-12-02 ENCOUNTER — OFFICE VISIT (OUTPATIENT)
Dept: PHYSICAL THERAPY | Facility: CLINIC | Age: 64
End: 2019-12-02
Payer: COMMERCIAL

## 2019-12-02 DIAGNOSIS — M75.02 ADHESIVE CAPSULITIS OF LEFT SHOULDER: Primary | ICD-10-CM

## 2019-12-02 PROCEDURE — 97110 THERAPEUTIC EXERCISES: CPT

## 2019-12-02 PROCEDURE — 97112 NEUROMUSCULAR REEDUCATION: CPT

## 2019-12-02 NOTE — PROGRESS NOTES
Daily Note     Today's date: 2019  Patient name: Heriberto Cabrera  : 1955  MRN: [id number]  Referring provider: Keenan Benson MD  Dx:   Encounter Diagnosis     ICD-10-CM    1  Adhesive capsulitis of left shoulder M75 02                   Subjective: R shoulder 5/10, L shoulder 2/10  Notes that motions overhead are improving overall but still remain painful  Has more pain in mornings      Objective: overhead pain free ROM improves B following MWM, pain in mid portion of YTB B ER w/ overhead lift  Assessment: Tolerated treatment well  Patient demonstrated fatigue post treatment and would benefit from continued PT  Pt does improve in his overhead mobility B following manual work and targeted posterior chain strengthening  He is appropriate for gentle progressions at next session before MD follow up later this week  He notes minimal pain in R>L shoulder to end session  Plan: Continue per plan of care  gentle overhead mobility work        Precautions: Standard     There ex: 25 min  Pulleys x 5 min  YTB overhead press for LT activation 2x10  YTB B ER w/ overhead lift x 20-30 total  GTB sh ext w/ hold 2x10 total  Supine alt isos 3x30 sec B  Supine RTB B horizontal abd w/ scap sq x 20 total       Neuro marisol/ Manual: 15 min  PROM x 10 mins total between B shoulder  Post GH joint mobs grade II/III x 3-4 mins total  MWM for overhead scaption x 10-15 B

## 2019-12-05 ENCOUNTER — OFFICE VISIT (OUTPATIENT)
Dept: PHYSICAL THERAPY | Facility: CLINIC | Age: 64
End: 2019-12-05
Payer: COMMERCIAL

## 2019-12-05 DIAGNOSIS — M75.02 ADHESIVE CAPSULITIS OF LEFT SHOULDER: Primary | ICD-10-CM

## 2019-12-05 PROCEDURE — 97112 NEUROMUSCULAR REEDUCATION: CPT

## 2019-12-05 PROCEDURE — 97110 THERAPEUTIC EXERCISES: CPT

## 2019-12-05 NOTE — PROGRESS NOTES
Daily Note     Today's date: 2019  Patient name: Hortensia Marie  : 1955  MRN: [id number]  Referring provider: Noé Boyle MD  Dx:   Encounter Diagnosis     ICD-10-CM    1  Adhesive capsulitis of left shoulder M75 02        Start Time:   Stop Time:   Total time in clinic (min): 45 minutes    Subjective: Pt reports inc pain in R>L prior to session  He reports he sees MD tomorrow  and will be discussing his L shld pain, pt feels that he has progressed well with L shld  Objective: overhead pain free ROM improves B following MWM, pain in mid portion of YTB B ER w/ overhead lift  Assessment: Tolerated treatment well  Patient demonstrated fatigue post treatment and would benefit from continued PT  Pt demo positive empty can on R  He demo inc pain on R with active flex  He demo low activity tolerance throughout  Plan: Pt sees MD this Friday, will f/u with PT with MD plan moving forward        Precautions: Standard     There ex: 25 min  Pulleys x 5 min  YTB overhead press for LT activation 2x10 (pain) attempted  YTB B ER w/ overhead lift x 20-30 total        Neuro marisol/ Manual: 15 min  PROM x 10 mins total between B shoulder  Post GH joint mobs grade II/III x 3-4 mins total  MWM for overhead scaption x 10-15 B

## 2019-12-06 ENCOUNTER — OFFICE VISIT (OUTPATIENT)
Dept: OBGYN CLINIC | Facility: CLINIC | Age: 64
End: 2019-12-06
Payer: COMMERCIAL

## 2019-12-06 DIAGNOSIS — M54.12 RADICULOPATHY, CERVICAL REGION: Primary | ICD-10-CM

## 2019-12-06 DIAGNOSIS — M75.81 ROTATOR CUFF TENDONITIS, RIGHT: ICD-10-CM

## 2019-12-06 PROCEDURE — 99213 OFFICE O/P EST LOW 20 MIN: CPT | Performed by: ORTHOPAEDIC SURGERY

## 2019-12-06 NOTE — PROGRESS NOTES
Assessment/Plan:  1  Radiculopathy, cervical region  Ambulatory referral to Physical Therapy   2  Rotator cuff tendonitis, right       As for the left shoulder, the patient is making good progress with physical therapy  Will continue exercises on his own at home  As for the right shoulder, this appears to be some mild rotator cuff tendinitis  I am also concerned about cervical radiculopathy given his neck pain and numbness into the right upper extremity  I did write him for physical therapy for both these issues  If his numbness gets worse or he starts about significant weakness about the right upper extremity as well, he was advised to follow up immediately  If doing well, he will follow up in 6 weeks for repeat evaluation      Subjective:   Lori Sun is a 71 y o  male who presents today for follow-up of his left shoulder  He states that this has been doing well with physical therapy  We have been treating him for adhesive capsulitis  He now complains of right shoulder pain as well as neck pain and numbness about the ulnar aspect of the forearm and hand  He states the shoulder pain is worse with activities and overhead movements  He notes the neck pain only started a couple days ago  He has had several episodes of waking up with numbness about the ulnar aspect of the forearm and hand  If he lies straight on his back with his arms at his sides, he does not experience any symptoms as much  Today, he notes good sensation about the right upper extremity  Review of Systems   Constitutional: Negative  Negative for chills and fever  HENT: Negative  Negative for ear pain and sore throat  Eyes: Negative  Negative for pain and redness  Respiratory: Negative  Negative for shortness of breath and wheezing  Cardiovascular: Negative for chest pain and palpitations  Gastrointestinal: Negative  Negative for abdominal pain and blood in stool  Endocrine: Negative    Negative for polydipsia and polyuria  Genitourinary: Negative  Negative for difficulty urinating and dysuria  Musculoskeletal:        As noted in HPI   Skin: Negative  Negative for pallor and rash  Neurological: Negative  Negative for dizziness and numbness  Hematological: Negative  Negative for adenopathy  Does not bruise/bleed easily  Psychiatric/Behavioral: Negative  Negative for confusion and suicidal ideas  No past medical history on file  No past surgical history on file      Family History   Problem Relation Age of Onset    Kidney failure Father     Hypertension Brother     No Known Problems Mother     No Known Problems Sister     No Known Problems Maternal Aunt     No Known Problems Maternal Uncle     No Known Problems Paternal Aunt     No Known Problems Paternal Uncle     No Known Problems Maternal Grandmother     No Known Problems Maternal Grandfather     No Known Problems Paternal Grandmother     No Known Problems Paternal Grandfather        Social History     Occupational History    Not on file   Tobacco Use    Smoking status: Never Smoker    Smokeless tobacco: Never Used   Substance and Sexual Activity    Alcohol use: Yes     Comment: occ    Drug use: No    Sexual activity: Not on file         Current Outpatient Medications:     amLODIPine (NORVASC) 10 mg tablet, Take 1 tablet (10 mg total) by mouth daily, Disp: 90 tablet, Rfl: 3    atorvastatin (LIPITOR) 10 mg tablet, Take 1 tablet (10 mg total) by mouth daily, Disp: 90 tablet, Rfl: 2    calcium carbonate (OS-CLEVELAND) 600 MG tablet, Take 600 mg by mouth 2 (two) times a day with meals, Disp: , Rfl:     glucosamine 500 MG CAPS capsule, Take 500 mg by mouth, Disp: , Rfl:     Menaquinone-7 (VITAMIN K2 PO), Take by mouth, Disp: , Rfl:     Omega-3 Fatty Acids (FISH OIL) 1,000 mg, Take 1,000 mg by mouth daily, Disp: , Rfl:     tamsulosin (FLOMAX) 0 4 mg, Take 0 8 mg by mouth daily, Disp: , Rfl: 11    telmisartan-hydrochlorothiazide (MICARDIS HCT) 80-12 5 MG per tablet, Take 1 tablet by mouth daily, Disp: 30 tablet, Rfl: 5    Allergies   Allergen Reactions    Tadalafil      In error         Objective: There were no vitals filed for this visit  Right Shoulder Exam     Tenderness   The patient is experiencing no tenderness  Range of Motion   Active abduction: normal   External rotation: normal   Forward flexion: normal   Internal rotation 0 degrees: normal     Muscle Strength   Abduction: 5/5   Internal rotation: 5/5   External rotation: 5/5     Tests   Drop arm: negative    Other   Erythema: absent  Sensation: normal  Pulse: present      Left Shoulder Exam     Tenderness   The patient is experiencing no tenderness  Range of Motion   External rotation: 20   Forward flexion: 140   Internal rotation 0 degrees: Lumbar     Muscle Strength   Abduction: 5/5   Internal rotation: 5/5   External rotation: 5/5     Tests   Drop arm: negative    Other   Erythema: absent  Sensation: normal  Pulse: present         Cervical spine:  Mild tenderness of the cervical spine  Full sensation bilateral upper extremities today  5/5 strength wrist extensors on the right and hand intrinsics  Physical Exam   Constitutional: He is oriented to person, place, and time  He appears well-developed and well-nourished  No distress  HENT:   Head: Normocephalic and atraumatic  Eyes: Conjunctivae and EOM are normal  No scleral icterus  Neck: No JVD present  Cardiovascular: Normal rate and intact distal pulses  Pulmonary/Chest: Effort normal  No respiratory distress  Neurological: He is alert and oriented to person, place, and time  Coordination normal    Skin: Skin is warm  Psychiatric: He has a normal mood and affect

## 2019-12-09 ENCOUNTER — OFFICE VISIT (OUTPATIENT)
Dept: PHYSICAL THERAPY | Facility: CLINIC | Age: 64
End: 2019-12-09
Payer: COMMERCIAL

## 2019-12-09 DIAGNOSIS — M75.02 ADHESIVE CAPSULITIS OF LEFT SHOULDER: Primary | ICD-10-CM

## 2019-12-09 PROCEDURE — 97110 THERAPEUTIC EXERCISES: CPT

## 2019-12-09 NOTE — PROGRESS NOTES
Daily Note     Today's date: 2019  Patient name: Teri Nolasco  : 1955  MRN: [id number]  Referring provider: Faisal Rebolledo MD  Dx:   Encounter Diagnosis     ICD-10-CM    1  Adhesive capsulitis of left shoulder M75 02        Start Time:   Stop Time: 1740  Total time in clinic (min): 45 minutes    Subjective: Pt reports reports his L shld is progressing well  He reports in pain in R 8/10 when waking in the morning  Objective: See treatment below      Assessment: Tolerated treatment well  Patient demonstrated fatigue post treatment and would benefit from continued PT  Pt demo positive empty can on R  He demo inc pain on R with active flex  He demo low activity tolerance throughout  Plan: Continue per plan of care        Precautions: Standard     There ex: 25 min  Pulleys x 5 min  Tband Rows/Ext: Yellow tubing x20  B ER: RTB x20          Neuro marisol/ Manual: 15 min  PROM x 5 mins total between R shoulder  C/S txn: 5 min  Post GH joint mobs grade II/III x 3-4 mins total  MWM for overhead scaption x 10-15 B

## 2019-12-10 ENCOUNTER — EVALUATION (OUTPATIENT)
Dept: PHYSICAL THERAPY | Facility: CLINIC | Age: 64
End: 2019-12-10
Payer: COMMERCIAL

## 2019-12-10 DIAGNOSIS — M75.02 ADHESIVE CAPSULITIS OF LEFT SHOULDER: Primary | ICD-10-CM

## 2019-12-10 DIAGNOSIS — M54.12 RADICULOPATHY, CERVICAL REGION: ICD-10-CM

## 2019-12-10 PROCEDURE — 97112 NEUROMUSCULAR REEDUCATION: CPT

## 2019-12-10 PROCEDURE — 97110 THERAPEUTIC EXERCISES: CPT

## 2019-12-10 PROCEDURE — 97140 MANUAL THERAPY 1/> REGIONS: CPT

## 2019-12-10 NOTE — PROGRESS NOTES
Progress Note     Today's date: 12/10/2019  Patient name: Swati Ornelas  : 1955  MRN: [id number]  Referring provider: Mikki Kaur MD  Dx:   Encounter Diagnosis     ICD-10-CM    1  Adhesive capsulitis of left shoulder M75 02    2  Radiculopathy, cervical region M54 12                   Subjective: Pt reports that L shoulder feels good, R shoulder is still sore  Saw MD who suspects possible cervical radiculopathy  Added this to prescription  Pt is to continue PT for the next 6 weeks before additional follow up  Notes that R shoulder pain is slightly different than R, hurts more w/ downwards portion of motions as compared to lifting arm  Pt is happy w/ L progress but frustrated by R  Functional update below:         Goals  Short Term Goals: Target Date 30 days  1  Initiate and advance HEP - achieved   2  Decrease c/o pain to 4/10 at worst - no progress   3  Increase ROM to WNL - progressed  4  Increase left shoulder strength by 1 grade - progressed     Long Term Goals: Target Date 60 days  1  Indep with HEP - progressed   2  Abolish c/o pain - progressed   3  [facility] - progressed     New Goals :  Short term goals (3 weeks):  1) Pt will further improve L shoulder ROM by 25% pain free  - progressed   2) Pt will further improve B LE and core strength deficits by 1/3 grade MMT  - progressed/achieved   3) Pt will improve pain at worse to <4/10  - progressed on L  4) Pt will progress HEP w/ special emphasis on functional shoulder stability and overhead ROM  - achieved     Long term goals (6 weeks)  1) Pt will improve FOTO to at least 69  - achieved   2) Pt will perform full day of work related activities w/ improved postural awareness and shoulder stability, pain <3/10  - progressed   3) Pt will sleep through the night in positioning of choosing 6/7 nights a week w/o waking due to pain   - progressed   4) Pt will be independent and compliant w/ HEP in order to maximize functional benefit of skilled PT following d/c  - progressed/achieved     New Goals 12/10:  Short term goals (3 weeks):  1) Pt will further improve R active shoulder ROM by 25% pain free  2) Pt will further improve B UE and postural strength deficits by 1/3 grade MMT  3) Pt will improve pain at worse to <4/10 in R shoulder  4) Pt will progress HEP w/ special emphasis on functional shoulder stability and overhead ROM  5) Pt will improve pain free cervical ROM by 25% pain free, p/w equal B cervical rotation  Long term goals (6 weeks)  1) Pt will improve FOTO to at least 90   2) Pt will perform full day of work related activities w/ improved postural awareness and shoulder stability, pain <3/10 in R shoulder   3) Pt will sleep through the night in positioning of choosing 6/7 nights a week w/o waking due to pain in R shoulder  4) Pt will be independent and compliant w/ HEP in order to maximize functional benefit of skilled PT following d/c          Pain  Current pain rating: Right 6-8, Left 1-2  At best pain ratin  At worst pain ratin  Location: Posterior Left Shoulder  Quality: dull ache  Relieving factors: heat  Aggravating factors: overhead activity and lifting  Progression: improved on L, worse on R     Social Support    Employment status: working  Hand dominance: right    Patient Goals  Patient goals for therapy: decreased pain  Patient goal: to not have any more pain        Objective  IE  Left Shoulder A/PROM    Flexion  90/95*  Abduction 30/45*  Int Rot  50/60*  Ext Rot 10/15*    Shoulder MMT   Left  Right  Flexion   3+/5* 4-/5  Abduction  3+/5* 4-/5  Int Rot   3+/5 4-/5  Ext Rot  3+/5 4-/5  * Denotes Pain    PN /  Left Shoulder A/PROM    Flexion  130/95*  Abduction 90/45*  Int Rot  Transverse process of L5/60*  Ext Rot C5 w/ min head turn/30-35*    Shoulder MMT   Left  Right  Flexion   4-/5* 4/5*  Abduction  4-/5* 4/5*  Int Rot   4-/5 4/5  Ext Rot  4-/5 4/5  * Denotes Pain    R shoulder ROM is WNL but is painful at end ranges of overhead motions flex>scap    PN 12/10  L shoulder ROM grossly WNL    R shoulder ROM limited in final 25% by pain and stiffness, worse pain w/ lowering     Shoulder MMT   Left  Right  Flexion   4-/5* 4-/5*  Abduction  4-/5* 4-/5*  Int Rot   4/5 4/5  Ext Rot  4/5 4/5  * Denotes Pain    Cervical ROM   Flex 50-75%  Ext: 50% w/ mod pain  Rot R: 42 deg w/ pain  Rot L: 52 w/ min pain  SB: 25-50% B     SNAGs at L side C4/5 improve R rotation  Cervical traction provides good relief     MWM for R shoulder abduction immediately improves overhead ROM    (+) cervical distraction and compression, (+) painful rotation to same side as pain, (-) ULTT     Pt can actively move R shoulder through near full ROM w/ min pain at end range only while cervical traction is applied    Assessment: Tolerated treatment well  Patient demonstrated fatigue post treatment and would benefit from continued PT  Pt reports fatigue and soreness through R side shoulder and neck by end of session, to slightly lesser extent than IE  Pt has done well w/ L shoulder treatment, emphasis on sessions will switch to include more R sided shoulder and neck work  He will continue on 2x/week basis for next 4-6 weeks before next MD follow up  Emphasis on pain relief first, and then improving functional stability  Plan: Continue per plan of care   MWM, SNAGs, traction, overhead work w/ traction      Precautions: Standard     There ex: 15 min  Pulleys x 4 min  Tband Rows/Ext: GTB x20 each  Manual UT and LS stretching 3x30 sec        Neuro marisol/ Manual: 25 min  PROM x 5 mins total between R shoulder  C/S txn: 2x2-3 min holds  R shoulder MWM x 8-10 total  ROM, MMT, FOTO, functional testing x10 min

## 2019-12-16 ENCOUNTER — OFFICE VISIT (OUTPATIENT)
Dept: PHYSICAL THERAPY | Facility: CLINIC | Age: 64
End: 2019-12-16
Payer: COMMERCIAL

## 2019-12-16 DIAGNOSIS — M75.02 ADHESIVE CAPSULITIS OF LEFT SHOULDER: Primary | ICD-10-CM

## 2019-12-16 DIAGNOSIS — M54.12 RADICULOPATHY, CERVICAL REGION: ICD-10-CM

## 2019-12-16 PROCEDURE — 97110 THERAPEUTIC EXERCISES: CPT

## 2019-12-16 PROCEDURE — 97140 MANUAL THERAPY 1/> REGIONS: CPT

## 2019-12-16 PROCEDURE — 97112 NEUROMUSCULAR REEDUCATION: CPT

## 2019-12-16 NOTE — PROGRESS NOTES
Daily Note     Today's date: 2019  Patient name: Marilin Méndez  : 1955  MRN: [id number]  Referring provider: Erickson Acosta MD  Dx:   Encounter Diagnosis     ICD-10-CM    1  Adhesive capsulitis of left shoulder M75 02    2  Radiculopathy, cervical region M54 12                   Subjective: Pt notes 4-5/10 pain to start session, pain was up to 8/10 at work today  Notes that neck and R side shoulder hurt, L shoulder is fine  R shoulder hurts in initial ranges of motion  Objective: improved overhead R shoulder ROM following cervical traction in supine  Pt fatigues quickly and requires max cueing for form w/ chin tuck DNF lifts  Assessment: Tolerated treatment well  Patient demonstrated fatigue post treatment and would benefit from continued PT  Pt does well w/ focus on cervical spine today  Significant weakness evident throughout DNF patterns, will benefit from further attention to this matter  Pt will be seen again next week as he had to cancel second visit for this week  Plan: Continue per plan of care   cervical mobility as able      Precautions: Standard     There ex: 15 min  Pulleys x 4 min w/ heat   Tband Rows/Ext: GTB x30 each  Manual UT and LS stretching 3x30 sec        Neuro marisol/ Manual: 30 min  PROM x 5 mins total between R shoulder w/ traction applied   C/S txn: 3-4x2-3 min holds  R shoulder MWM x 8-10 total trialed   DNF muscle testing and exercise - 12 mins w/ biofeedback

## 2019-12-19 ENCOUNTER — APPOINTMENT (OUTPATIENT)
Dept: PHYSICAL THERAPY | Facility: CLINIC | Age: 64
End: 2019-12-19
Payer: COMMERCIAL

## 2019-12-23 ENCOUNTER — OFFICE VISIT (OUTPATIENT)
Dept: PHYSICAL THERAPY | Facility: CLINIC | Age: 64
End: 2019-12-23
Payer: COMMERCIAL

## 2019-12-23 DIAGNOSIS — M54.12 RADICULOPATHY, CERVICAL REGION: ICD-10-CM

## 2019-12-23 DIAGNOSIS — M75.02 ADHESIVE CAPSULITIS OF LEFT SHOULDER: Primary | ICD-10-CM

## 2019-12-23 PROCEDURE — 97110 THERAPEUTIC EXERCISES: CPT

## 2019-12-23 PROCEDURE — 97112 NEUROMUSCULAR REEDUCATION: CPT

## 2019-12-23 PROCEDURE — 97140 MANUAL THERAPY 1/> REGIONS: CPT

## 2019-12-23 NOTE — PROGRESS NOTES
Daily Note     Today's date: 2019  Patient name: Esdras Laura  : 1955  MRN: [id number]  Referring provider: Fadi Gomez MD  Dx:   Encounter Diagnosis     ICD-10-CM    1  Adhesive capsulitis of left shoulder M75 02    2  Radiculopathy, cervical region M54 12        Start Time: 1645  Stop Time: 1735  Total time in clinic (min): 50 minutes    Subjective: Pt notes 4/10 pain in R shld prior to session  He reports he has massage yesterday and experienced relief  Objective: see treatment below       Assessment: Tolerated treatment well  Patient demonstrated fatigue post treatment and would benefit from continued PT  Pt able to perform c/s exercises with good understanding and proper technique  Plan: Continue per plan of care         Precautions: Standard     There ex: 20 min  Pulleys x 4 min w/ heat   Tband Rows/Ext: GTB x30 each  Manual UT and LS stretching 3x30 sec        Neuro marisol/ Manual: 30 min  PROM x 5 mins total between R shoulder w/ traction applied   C/S txn: 3-4x2-3 min holds  R shoulder MWM x 8-10 total trialed   DNF exercise - 10 mins w/ biofeedback  DNF lift- x20

## 2019-12-30 ENCOUNTER — OFFICE VISIT (OUTPATIENT)
Dept: PHYSICAL THERAPY | Facility: CLINIC | Age: 64
End: 2019-12-30
Payer: COMMERCIAL

## 2019-12-30 DIAGNOSIS — M54.12 RADICULOPATHY, CERVICAL REGION: ICD-10-CM

## 2019-12-30 DIAGNOSIS — M75.02 ADHESIVE CAPSULITIS OF LEFT SHOULDER: Primary | ICD-10-CM

## 2019-12-30 PROCEDURE — 97110 THERAPEUTIC EXERCISES: CPT

## 2019-12-30 NOTE — PROGRESS NOTES
Daily Note     Today's date: 2019  Patient name: Marilin Méndez  : 1955  MRN: [id number]  Referring provider: Erickson Acosta MD  Dx:   Encounter Diagnosis     ICD-10-CM    1  Adhesive capsulitis of left shoulder M75 02    2  Radiculopathy, cervical region M54 12                   Subjective: Pt reports inc pain in R shld when sleeping  He reports minimal pain prior to session  Objective: see treatment below       Assessment: Tolerated treatment well  Patient demonstrated fatigue post treatment and would benefit from continued PT  Pt was educated on sleeping with pillow under arm to dec pain  Plan: Continue per plan of care         Precautions: Standard     There ex: 20 min  Pulleys x 4 min  Tband Rows/Ext: GTB x30 each  Manual UT and LS stretching 3x30 sec  Doorway/corner str 10x        Neuro marisol/ Manual: 30 min  PROM x 5 mins total between R shoulder w/ traction applied   C/S txn: 3-4x2-3 min holds  R shoulder MWM x 8-10 total trialed   DNF exercise - 10 mins w/ biofeedback  DNF lift- x20

## 2020-01-02 ENCOUNTER — OFFICE VISIT (OUTPATIENT)
Dept: PHYSICAL THERAPY | Facility: CLINIC | Age: 65
End: 2020-01-02
Payer: COMMERCIAL

## 2020-01-02 DIAGNOSIS — M54.12 RADICULOPATHY, CERVICAL REGION: ICD-10-CM

## 2020-01-02 DIAGNOSIS — M75.02 ADHESIVE CAPSULITIS OF LEFT SHOULDER: Primary | ICD-10-CM

## 2020-01-02 PROCEDURE — 97110 THERAPEUTIC EXERCISES: CPT

## 2020-01-02 PROCEDURE — 97140 MANUAL THERAPY 1/> REGIONS: CPT

## 2020-01-02 NOTE — PROGRESS NOTES
Daily Note     Today's date: 2020  Patient name: Keyona Houston  : 1955  MRN: [id number]  Referring provider: Heriberto Brizuela MD  Dx:   Encounter Diagnosis     ICD-10-CM    1  Adhesive capsulitis of left shoulder M75 02    2  Radiculopathy, cervical region M54 12        Start Time: 1645  Stop Time: 1730  Total time in clinic (min): 45 minutes    Subjective: Pt reports 7/10 pain in R shld  Pt reports his pain is not improving  He states "My neck is ok"  Objective: see treatment below       Assessment: Tolerated treatment well  Patient demonstrated fatigue post treatment and would benefit from continued PT  Pt performed exercises, however, he reported pain during  Plan: Continue per plan of care         Precautions: Standard     There Ex: 20 min  Pulleys x 4 min  Tband Rows/Ext: yellow tubing x30 each  Horiz Abd RTB x20  B ER RTB x20  Doorway/corner str 10x        Neuro marisol/ Manual: 30 min  PROM x 5 mins total between R shoulder w/ traction applied       Ice post 5 min supine R shld

## 2020-01-06 ENCOUNTER — OFFICE VISIT (OUTPATIENT)
Dept: PHYSICAL THERAPY | Facility: CLINIC | Age: 65
End: 2020-01-06
Payer: COMMERCIAL

## 2020-01-06 DIAGNOSIS — M54.12 RADICULOPATHY, CERVICAL REGION: ICD-10-CM

## 2020-01-06 DIAGNOSIS — M75.02 ADHESIVE CAPSULITIS OF LEFT SHOULDER: Primary | ICD-10-CM

## 2020-01-06 PROCEDURE — 97110 THERAPEUTIC EXERCISES: CPT

## 2020-01-06 PROCEDURE — 97140 MANUAL THERAPY 1/> REGIONS: CPT

## 2020-01-06 PROCEDURE — 97112 NEUROMUSCULAR REEDUCATION: CPT

## 2020-01-06 NOTE — PROGRESS NOTES
Daily Note     Today's date: 2020  Patient name: Louise Lopez  : 1955  MRN: [id number]  Referring provider: Zane Candelario MD  Dx:   Encounter Diagnosis     ICD-10-CM    1  Adhesive capsulitis of left shoulder M75 02    2  Radiculopathy, cervical region M54 12        Start Time: 1645  Stop Time: 1730  Total time in clinic (min): 45 minutes    Subjective: Pt reports "a little better" He reports he is using topical pain reliever and has improved his pain  Objective: see treatment below       Assessment: Tolerated treatment well  Patient demonstrated fatigue post treatment and would benefit from continued PT  Pt performed exercises with dec pain today  Pt reports his MD office called and moved appt to     Plan: Continue per plan of care    Will cont with PT until MD appt      Precautions: Standard     There Ex: 20 min  Pulleys x 4 min  Tband Rows/Ext: GTB x30 each  Horiz Abd RTB x20  B ER RTB x20  Doorway/corner str 10x        Neuro marisol/ Manual: 30 min  PROM x 5 mins total between R shoulder w/ traction applied       Ice post 5 min supine R shld

## 2020-01-09 ENCOUNTER — APPOINTMENT (OUTPATIENT)
Dept: PHYSICAL THERAPY | Facility: CLINIC | Age: 65
End: 2020-01-09
Payer: COMMERCIAL

## 2020-01-13 ENCOUNTER — OFFICE VISIT (OUTPATIENT)
Dept: PHYSICAL THERAPY | Facility: CLINIC | Age: 65
End: 2020-01-13
Payer: COMMERCIAL

## 2020-01-13 DIAGNOSIS — M54.12 RADICULOPATHY, CERVICAL REGION: ICD-10-CM

## 2020-01-13 DIAGNOSIS — M75.02 ADHESIVE CAPSULITIS OF LEFT SHOULDER: Primary | ICD-10-CM

## 2020-01-13 PROCEDURE — 97110 THERAPEUTIC EXERCISES: CPT

## 2020-01-13 PROCEDURE — 97112 NEUROMUSCULAR REEDUCATION: CPT

## 2020-01-13 NOTE — PROGRESS NOTES
Daily Note     Today's date: 2020  Patient name: Baylee Gutierrez  : 1955  MRN: [id number]  Referring provider: Robert Doherty MD  Dx:   Encounter Diagnosis     ICD-10-CM    1  Adhesive capsulitis of left shoulder M75 02    2  Radiculopathy, cervical region M54 12        Start Time: 1645  Stop Time: 1730  Total time in clinic (min): 45 minutes    Subjective: Pt reports "getting better"  He reports 4/10 pain in R shld prior to session  Objective: see treatment below       Assessment: Tolerated treatment well  Patient demonstrated fatigue post treatment and would benefit from continued PT  Pt demo good tolerance with exercises, no inc pain reported during and after session  Plan: Continue per plan of care    Will cont with PT until MD appt      Precautions: Standard     There Ex: 20 min  Pulleys x 4 min  Tband Rows/Ext: GTB x30 each  Horiz Abd RTB x20  B ER RTB x20  Doorway/corner str 10x  Shld ext str w/ cane 20x5s        Neuro marisol/ Manual: 30 min  PROM x 5 mins      Ice deferred  Heat 5 min pre seated

## 2020-01-16 ENCOUNTER — APPOINTMENT (OUTPATIENT)
Dept: PHYSICAL THERAPY | Facility: CLINIC | Age: 65
End: 2020-01-16
Payer: COMMERCIAL

## 2020-01-20 ENCOUNTER — OFFICE VISIT (OUTPATIENT)
Dept: PHYSICAL THERAPY | Facility: CLINIC | Age: 65
End: 2020-01-20
Payer: COMMERCIAL

## 2020-01-20 DIAGNOSIS — M54.12 RADICULOPATHY, CERVICAL REGION: ICD-10-CM

## 2020-01-20 DIAGNOSIS — M75.02 ADHESIVE CAPSULITIS OF LEFT SHOULDER: Primary | ICD-10-CM

## 2020-01-20 PROCEDURE — 97140 MANUAL THERAPY 1/> REGIONS: CPT

## 2020-01-20 PROCEDURE — 97110 THERAPEUTIC EXERCISES: CPT

## 2020-01-20 NOTE — PROGRESS NOTES
Daily Note     Today's date: 2020  Patient name: Mathew Medellin  : 1955  MRN: [id number]  Referring provider: Johanny Ricks MD  Dx:   Encounter Diagnosis     ICD-10-CM    1  Adhesive capsulitis of left shoulder M75 02    2  Radiculopathy, cervical region M54 12                   Subjective: Pt reports minimal pain prior to session  Objective: see treatment below       Assessment: Tolerated treatment well  Patient demonstrated fatigue post treatment and would benefit from continued PT  Pt demo understanding of HEP       Plan: Discharge      Precautions: Standard     There Ex: 20 min  Pulleys x 4 min  Tband Rows/Ext: GTB x30 each  Horiz Abd RTB x20  B ER RTB x20  Doorway/corner str 10x  Shld ext str w/ cane 20x5s        Neuro marisol/ Manual: 30 min  PROM x 5 mins      Ice deferred  Heat 5 min pre seated

## 2020-01-21 NOTE — PROGRESS NOTES
Update 3/11/20: Chart to be formally d/c  He has met w/ referring MD and will evaluate options after possible FDC this summer  Pt to continue w/ HEP for now  Tentative d/c note:      Goals  Short Term Goals: Target Date 30 days  1  Initiate and advance HEP - achieved   2  Decrease c/o pain to 4/10 at worst - no progress   3  Increase ROM to WNL - progressed  4  Increase left shoulder strength by 1 grade - progressed     Long Term Goals: Target Date 60 days  1  Indep with HEP - progressed   2  Abolish c/o pain - progressed   3  Increase Left shoulder strength to [facility] - progressed     New Goals 11/18:  Short term goals (3 weeks):  1) Pt will further improve L shoulder ROM by 25% pain free  - progressed   2) Pt will further improve B LE and core strength deficits by 1/3 grade MMT  - progressed/achieved   3) Pt will improve pain at worse to <4/10  - progressed on L  4) Pt will progress HEP w/ special emphasis on functional shoulder stability and overhead ROM  - achieved     Long term goals (6 weeks)  1) Pt will improve FOTO to at least 69  - achieved   2) Pt will perform full day of work related activities w/ improved postural awareness and shoulder stability, pain <3/10  - progressed   3) Pt will sleep through the night in positioning of choosing 6/7 nights a week w/o waking due to pain  - progressed   4) Pt will be independent and compliant w/ HEP in order to maximize functional benefit of skilled PT following d/c  - progressed/achieved     New Goals 12/10:  Short term goals (3 weeks): ALL PROGRESSED   1) Pt will further improve R active shoulder ROM by 25% pain free  2) Pt will further improve B UE and postural strength deficits by 1/3 grade MMT  3) Pt will improve pain at worse to <4/10 in R shoulder  4) Pt will progress HEP w/ special emphasis on functional shoulder stability and overhead ROM  5) Pt will improve pain free cervical ROM by 25% pain free, p/w equal B cervical rotation      Long term goals (6 weeks)  1) Pt will improve FOTO to at least 90  - no progress  2) Pt will perform full day of work related activities w/ improved postural awareness and shoulder stability, pain <3/10 in R shoulder - progressed   3) Pt will sleep through the night in positioning of choosing 6/7 nights a week w/o waking due to pain in R shoulder - progressed/achieved   4) Pt will be independent and compliant w/ HEP in order to maximize functional benefit of skilled PT following d/c  - progressed/achieved         Pain  Current pain rating: Right 6-8, Left 1-2  At best pain ratin-2  At worst pain ratin-8  Location: Posterior Left Shoulder  Quality: dull ache  Relieving factors: heat  Aggravating factors: overhead activity and lifting  Progression: improved B since IE    Social Support    Employment status: working  Hand dominance: right    Patient Goals  Patient goals for therapy: decreased pain  Patient goal: to not have any more pain        Objective  IE  Left Shoulder A/PROM    Flexion  90/95*  Abduction 30/45*  Int Rot  50/60*  Ext Rot 10/15*    Shoulder MMT   Left  Right  Flexion   3+/5* 4-/5  Abduction  3+/5* 4-/5  Int Rot   3+/5 4-/5  Ext Rot  3+/5 4-/5  * Denotes Pain    PN 11/18  Left Shoulder A/PROM    Flexion  130/95*  Abduction 90/45*  Int Rot  Transverse process of L5/60*  Ext Rot C5 w/ min head turn/30-35*    Shoulder MMT   Left  Right  Flexion   4-/5* 4/5*  Abduction  4-/5* 4/5*  Int Rot   4-/5 4/5  Ext Rot  4-/5 4/5  * Denotes Pain    R shoulder ROM is WNL but is painful at end ranges of overhead motions flex>scap    PN 12/10  L shoulder ROM grossly WNL    R shoulder ROM limited in final 25% by pain and stiffness, worse pain w/ lowering     Shoulder MMT   Left  Right  Flexion   4-/5* 4-/5*  Abduction  4-/5* 4-/5*  Int Rot   4/5 4/5  Ext Rot  4/5 4/5  * Denotes Pain    Cervical ROM   Flex 50-75%  Ext: 50% w/ mod pain  Rot R: 42 deg w/ pain  Rot L: 52 w/ min pain  SB: 25-50% B     SNAGs at L side C4/5 improve R rotation  Cervical traction provides good relief     MWM for R shoulder abduction immediately improves overhead ROM    (+) cervical distraction and compression, (+) painful rotation to same side as pain, (-) ULTT     Pt can actively move R shoulder through near full ROM w/ min pain at end range only while cervical traction is applied    Progress 1/20/20:    R shoulder MMT 4/5 throughout w/o pain  R shoulder AROM  Flex: 150  Abd: 107  ER: 55  IR: 35    Pain w/ abduction only     Cervical ROM not tested but grossly WNL    Pt has made progress since his start here  He notes continued shoulder discomfort but does have more controlled ROM and less pain on average  Has MD follow up later this month and feels confident w/ HEP moving forward  Will re-start PT if indicated by referring MD  He shows good competence w/ HEP today, heavily reviewed w/ pt by PTA  Chart to be re-opened should further treatment be indicated

## 2020-01-29 ENCOUNTER — APPOINTMENT (OUTPATIENT)
Dept: RADIOLOGY | Facility: CLINIC | Age: 65
End: 2020-01-29
Payer: COMMERCIAL

## 2020-01-29 ENCOUNTER — OFFICE VISIT (OUTPATIENT)
Dept: OBGYN CLINIC | Facility: CLINIC | Age: 65
End: 2020-01-29
Payer: COMMERCIAL

## 2020-01-29 VITALS
DIASTOLIC BLOOD PRESSURE: 64 MMHG | BODY MASS INDEX: 24.07 KG/M2 | SYSTOLIC BLOOD PRESSURE: 123 MMHG | HEIGHT: 64 IN | HEART RATE: 61 BPM | WEIGHT: 141 LBS

## 2020-01-29 DIAGNOSIS — M25.511 ACUTE PAIN OF RIGHT SHOULDER: ICD-10-CM

## 2020-01-29 DIAGNOSIS — M54.12 RADICULOPATHY, CERVICAL REGION: ICD-10-CM

## 2020-01-29 DIAGNOSIS — M75.81 ROTATOR CUFF TENDONITIS, RIGHT: Primary | ICD-10-CM

## 2020-01-29 PROCEDURE — 99213 OFFICE O/P EST LOW 20 MIN: CPT | Performed by: ORTHOPAEDIC SURGERY

## 2020-01-29 PROCEDURE — 73030 X-RAY EXAM OF SHOULDER: CPT

## 2020-01-29 NOTE — PROGRESS NOTES
Assessment/Plan:  1  Rotator cuff tendonitis, right  MRI shoulder right wo contrast   2  Radiculopathy, cervical region     3  Acute pain of right shoulder  XR shoulder 2+ vw right       Scribe Attestation    I,:   Donnell Varela am acting as a scribe while in the presence of the attending physician :        I,:   Genoveva Duran MD personally performed the services described in this documentation    as scribed in my presence :          Upon physical examination of his right shoulder, he continues to demonstrate weakness and pain with abduction testing  I do believe an MRI of his shoulder to assess for a possible rotator cuff tear is warranted  I provided him with a prescription for this today in the office  As well, I am concerned for the intermittent numbness he has in his pinky  However, since he states it is not constant I am okay with holding off on getting an EMG nerve conduction study  He verbally stated he understood and agreed with this  At this time, I will have him follow up back in the office after he receives his MRI to review the results and discuss further treatment options  His formal physical therapy notes were reviewed today in the office  Subjective:   Lashonda Antonio is a 71 y o  male who presents to the office today for follow-up evaluation of right shoulder pain  At his last appointment, we prescribed him formal physical therapy for his shoulder and for cervical radiculopathy  He states he has been compliant with physical therapy  He states he no longer has neck pain, however continues to have shoulder pain  As well, he continues to have intermittent numbness into his pinky finger  At today's visit, he localizes majority of his pain on the anterior aspect of the shoulder  He describes the pain as activity related, achy and mild in intensity  He does appreciate decreased range of motion        Review of Systems   Constitutional: Negative for chills, fever and unexpected weight change  HENT: Negative for hearing loss, nosebleeds and sore throat  Eyes: Negative for pain, redness and visual disturbance  Respiratory: Negative for cough, shortness of breath and wheezing  Cardiovascular: Negative for chest pain, palpitations and leg swelling  Gastrointestinal: Negative for abdominal pain, nausea and vomiting  Endocrine: Negative for polyphagia and polyuria  Genitourinary: Negative for dysuria and hematuria  Musculoskeletal: Positive for myalgias  See HPI   Skin: Negative for rash and wound  Neurological: Positive for numbness (right pinky)  Negative for dizziness and headaches  Psychiatric/Behavioral: Negative for decreased concentration and suicidal ideas  The patient is not nervous/anxious  History reviewed  No pertinent past medical history  History reviewed  No pertinent surgical history      Family History   Problem Relation Age of Onset    Kidney failure Father     Hypertension Brother     No Known Problems Mother     No Known Problems Sister     No Known Problems Maternal Aunt     No Known Problems Maternal Uncle     No Known Problems Paternal Aunt     No Known Problems Paternal Uncle     No Known Problems Maternal Grandmother     No Known Problems Maternal Grandfather     No Known Problems Paternal Grandmother     No Known Problems Paternal Grandfather        Social History     Occupational History    Not on file   Tobacco Use    Smoking status: Never Smoker    Smokeless tobacco: Never Used   Substance and Sexual Activity    Alcohol use: Yes     Comment: occ    Drug use: No    Sexual activity: Not on file         Current Outpatient Medications:     amLODIPine (NORVASC) 10 mg tablet, Take 1 tablet (10 mg total) by mouth daily, Disp: 90 tablet, Rfl: 3    atorvastatin (LIPITOR) 10 mg tablet, Take 1 tablet (10 mg total) by mouth daily, Disp: 90 tablet, Rfl: 2    calcium carbonate (OS-CLEVELAND) 600 MG tablet, Take 600 mg by mouth 2 (two) times a day with meals, Disp: , Rfl:     glucosamine 500 MG CAPS capsule, Take 500 mg by mouth, Disp: , Rfl:     Menaquinone-7 (VITAMIN K2 PO), Take by mouth, Disp: , Rfl:     tamsulosin (FLOMAX) 0 4 mg, Take 0 8 mg by mouth daily, Disp: , Rfl: 11    telmisartan-hydrochlorothiazide (MICARDIS HCT) 80-12 5 MG per tablet, Take 1 tablet by mouth daily, Disp: 30 tablet, Rfl: 5    Omega-3 Fatty Acids (FISH OIL) 1,000 mg, Take 1,000 mg by mouth daily, Disp: , Rfl:     Allergies   Allergen Reactions    Tadalafil      In error         Objective:  Vitals:    01/29/20 1723   BP: 123/64   Pulse: 61       Right Shoulder Exam     Tenderness   The patient is experiencing tenderness in the biceps tendon  Range of Motion   Active abduction: 110   Forward flexion: 120     Muscle Strength   Abduction: 4/5   Internal rotation: 5/5   External rotation: 5/5   Biceps: 4/5     Other   Erythema: absent  Sensation: normal  Pulse: present    Comments:    Empty Can (+)  Speeds (+)            Physical Exam   Constitutional: He is oriented to person, place, and time  He appears well-developed and well-nourished  HENT:   Head: Normocephalic and atraumatic  Eyes: Pupils are equal, round, and reactive to light  Conjunctivae are normal    Neck: Normal range of motion  Neck supple  Cardiovascular: Normal rate and intact distal pulses  Pulmonary/Chest: Effort normal  No respiratory distress  Musculoskeletal:   As noted in HPI   Neurological: He is alert and oriented to person, place, and time  Skin: Skin is warm and dry  Psychiatric: He has a normal mood and affect  His behavior is normal    Nursing note and vitals reviewed  I have personally reviewed pertinent films in PACS and my interpretation is as follows:  X-rays of the right shoulder obtained on 01/29/2020 demonstrates mild AC joint osteoarthritis

## 2020-02-06 ENCOUNTER — HOSPITAL ENCOUNTER (OUTPATIENT)
Dept: RADIOLOGY | Facility: HOSPITAL | Age: 65
Discharge: HOME | End: 2020-02-06
Attending: ORTHOPAEDIC SURGERY
Payer: COMMERCIAL

## 2020-02-06 DIAGNOSIS — M75.81 ROTATOR CUFF TENDONITIS, RIGHT: ICD-10-CM

## 2020-02-06 PROCEDURE — 73221 MRI JOINT UPR EXTREM W/O DYE: CPT

## 2020-02-12 ENCOUNTER — OFFICE VISIT (OUTPATIENT)
Dept: OBGYN CLINIC | Facility: CLINIC | Age: 65
End: 2020-02-12
Payer: COMMERCIAL

## 2020-02-12 VITALS
HEIGHT: 64 IN | SYSTOLIC BLOOD PRESSURE: 120 MMHG | HEART RATE: 72 BPM | WEIGHT: 138.6 LBS | BODY MASS INDEX: 23.66 KG/M2 | DIASTOLIC BLOOD PRESSURE: 69 MMHG

## 2020-02-12 DIAGNOSIS — M19.011 PRIMARY OSTEOARTHRITIS OF RIGHT SHOULDER: ICD-10-CM

## 2020-02-12 DIAGNOSIS — M75.111 NONTRAUMATIC INCOMPLETE TEAR OF RIGHT ROTATOR CUFF: Primary | ICD-10-CM

## 2020-02-12 DIAGNOSIS — M75.81 ROTATOR CUFF TENDONITIS, RIGHT: ICD-10-CM

## 2020-02-12 PROCEDURE — 99214 OFFICE O/P EST MOD 30 MIN: CPT | Performed by: ORTHOPAEDIC SURGERY

## 2020-02-12 RX ORDER — PAROXETINE HYDROCHLORIDE 20 MG/1
20 TABLET, FILM COATED ORAL DAILY
COMMUNITY
Start: 2020-02-04 | End: 2022-07-28

## 2020-02-12 NOTE — PROGRESS NOTES
Assessment/Plan:  1  Nontraumatic incomplete tear of right rotator cuff     2  Rotator cuff tendonitis, right     3  Primary osteoarthritis of right shoulder         Scribe Attestation    I,:   Slick Farley am acting as a scribe while in the presence of the attending physician :        I,:   Juan Powers MD personally performed the services described in this documentation    as scribed in my presence :          MRI of his right shoulder demonstrates a small full-thickness tear to the supraspinatus with associated insertional tendinosis  Upon physical examination, he has relatively good range of motion, however does have a positive empty can sign  I did provide him with option of conservative treatment versus surgical treatment  He has done formal physical therapy and has seen some results with it, however still has some discomfort  I explained to him that he can continue the home exercise program that his physical therapist provided him  The other option would be a right shoulder arthroscopy and rotator cuff repair  I explained the recovery for this would be to be in a sling for 6 weeks postoperatively  Over time his tear can get worse  He states he plans on retiring sometime this summer and if he does, he would like surgery around that time  At this time, he states he will continue his home exercise program and call us sometime in the summer to schedule surgery  Subjective:   Martha Austin is a 71 y o  male who presents to the office today for MRI follow-up of his right shoulder  He had discontinued formal physical therapy since his last visit  He states he feels slightly better since his last visit, however continues to have discomfort about his shoulder  He does state that the numbness into his pinky is better and is not as frequent  At today's visit, he localizes the majority of his pain on the lateral aspect of his shoulder    He describes the pain as transient, achy and mild to moderate in intensity  He denies any radicular symptoms  He denies any numbness and tingling  Review of Systems   Constitutional: Negative for chills, fever and unexpected weight change  HENT: Negative for hearing loss, nosebleeds and sore throat  Eyes: Negative for pain, redness and visual disturbance  Respiratory: Negative for cough, shortness of breath and wheezing  Cardiovascular: Negative for chest pain, palpitations and leg swelling  Gastrointestinal: Negative for abdominal pain, nausea and vomiting  Endocrine: Negative for polyphagia and polyuria  Genitourinary: Negative for dysuria and hematuria  Musculoskeletal: Positive for arthralgias and myalgias  See HPI   Skin: Negative for rash and wound  Neurological: Negative for dizziness, numbness and headaches  Psychiatric/Behavioral: Negative for decreased concentration and suicidal ideas  The patient is not nervous/anxious  History reviewed  No pertinent past medical history  History reviewed  No pertinent surgical history      Family History   Problem Relation Age of Onset    Kidney failure Father     Hypertension Brother     No Known Problems Mother     No Known Problems Sister     No Known Problems Maternal Aunt     No Known Problems Maternal Uncle     No Known Problems Paternal Aunt     No Known Problems Paternal Uncle     No Known Problems Maternal Grandmother     No Known Problems Maternal Grandfather     No Known Problems Paternal Grandmother     No Known Problems Paternal Grandfather        Social History     Occupational History    Not on file   Tobacco Use    Smoking status: Never Smoker    Smokeless tobacco: Never Used   Substance and Sexual Activity    Alcohol use: Yes     Comment: occ    Drug use: No    Sexual activity: Not on file         Current Outpatient Medications:     amLODIPine (NORVASC) 10 mg tablet, Take 1 tablet (10 mg total) by mouth daily, Disp: 90 tablet, Rfl: 3    atorvastatin (LIPITOR) 10 mg tablet, Take 1 tablet (10 mg total) by mouth daily, Disp: 90 tablet, Rfl: 2    calcium carbonate (OS-CLEVELAND) 600 MG tablet, Take 600 mg by mouth 2 (two) times a day with meals, Disp: , Rfl:     glucosamine 500 MG CAPS capsule, Take 500 mg by mouth, Disp: , Rfl:     Menaquinone-7 (VITAMIN K2 PO), Take by mouth, Disp: , Rfl:     Omega-3 Fatty Acids (FISH OIL) 1,000 mg, Take 1,000 mg by mouth daily, Disp: , Rfl:     PARoxetine (PAXIL) 20 mg tablet, Take 20 mg by mouth daily, Disp: , Rfl:     tamsulosin (FLOMAX) 0 4 mg, Take 0 8 mg by mouth daily, Disp: , Rfl: 11    telmisartan-hydrochlorothiazide (MICARDIS HCT) 80-12 5 MG per tablet, Take 1 tablet by mouth daily, Disp: 30 tablet, Rfl: 5    Allergies   Allergen Reactions    Tadalafil      In error         Objective:  Vitals:    02/12/20 1518   BP: 120/69   Pulse: 72       Right Shoulder Exam     Tenderness   Right shoulder tenderness location: Greater tuberosity  Range of Motion   Active abduction: 120   Forward flexion: 140   Internal rotation 0 degrees: L5     Muscle Strength   Abduction: 4/5   Internal rotation: 5/5   External rotation: 5/5     Tests   Bruno test: positive  Impingement: positive  Drop arm: positive    Other   Erythema: absent  Sensation: normal  Pulse: present    Comments:    Empty Can (+)            Physical Exam   Constitutional: He is oriented to person, place, and time  He appears well-developed and well-nourished  HENT:   Head: Normocephalic and atraumatic  Eyes: Pupils are equal, round, and reactive to light  Conjunctivae are normal    Neck: Normal range of motion  Neck supple  Cardiovascular: Normal rate and intact distal pulses  Pulmonary/Chest: Effort normal  No respiratory distress  Musculoskeletal:   As noted in HPI   Neurological: He is alert and oriented to person, place, and time  Skin: Skin is warm and dry  Psychiatric: He has a normal mood and affect   His behavior is normal    Nursing note and vitals reviewed  I have personally reviewed pertinent films in PACS and my interpretation is as follows:  MRI of the right shoulder obtained on 02/06/2020 demonstrates a small full-thickness tear to the supraspinatus with associated insertional tendinosis

## 2020-02-16 DIAGNOSIS — I10 ESSENTIAL HYPERTENSION: ICD-10-CM

## 2020-02-16 RX ORDER — AMLODIPINE BESYLATE 10 MG/1
TABLET ORAL
Qty: 30 TABLET | Refills: 0 | Status: SHIPPED | OUTPATIENT
Start: 2020-02-16 | End: 2020-03-16

## 2020-03-02 DIAGNOSIS — E78.5 DYSLIPIDEMIA: ICD-10-CM

## 2020-03-03 RX ORDER — ATORVASTATIN CALCIUM 10 MG/1
TABLET, FILM COATED ORAL
Qty: 30 TABLET | Refills: 0 | Status: SHIPPED | OUTPATIENT
Start: 2020-03-03 | End: 2020-05-03

## 2020-03-09 DIAGNOSIS — I10 ESSENTIAL HYPERTENSION: ICD-10-CM

## 2020-03-09 RX ORDER — TELMISARTAN AND HYDROCHLORTHIAZIDE 80; 12.5 MG/1; MG/1
TABLET ORAL
Qty: 30 TABLET | Refills: 0 | Status: SHIPPED | OUTPATIENT
Start: 2020-03-09 | End: 2020-03-11 | Stop reason: SDUPTHER

## 2020-03-10 ENCOUNTER — TRANSCRIBE ORDERS (OUTPATIENT)
Dept: ADMINISTRATIVE | Facility: HOSPITAL | Age: 65
End: 2020-03-10

## 2020-03-10 DIAGNOSIS — R42 DIZZINESS: Primary | ICD-10-CM

## 2020-03-10 DIAGNOSIS — I10 ESSENTIAL HYPERTENSION, MALIGNANT: ICD-10-CM

## 2020-03-11 DIAGNOSIS — I10 ESSENTIAL HYPERTENSION: ICD-10-CM

## 2020-03-11 RX ORDER — TELMISARTAN AND HYDROCHLORTHIAZIDE 80; 12.5 MG/1; MG/1
1 TABLET ORAL DAILY
Qty: 30 TABLET | Refills: 5 | Status: SHIPPED | OUTPATIENT
Start: 2020-03-11 | End: 2020-09-13

## 2020-03-13 ENCOUNTER — OFFICE VISIT (OUTPATIENT)
Dept: OBGYN CLINIC | Facility: CLINIC | Age: 65
End: 2020-03-13
Payer: COMMERCIAL

## 2020-03-13 VITALS
DIASTOLIC BLOOD PRESSURE: 63 MMHG | WEIGHT: 137 LBS | HEIGHT: 64 IN | BODY MASS INDEX: 23.39 KG/M2 | HEART RATE: 75 BPM | SYSTOLIC BLOOD PRESSURE: 109 MMHG

## 2020-03-13 DIAGNOSIS — M75.111 NONTRAUMATIC INCOMPLETE TEAR OF RIGHT ROTATOR CUFF: Primary | ICD-10-CM

## 2020-03-13 PROCEDURE — 99214 OFFICE O/P EST MOD 30 MIN: CPT | Performed by: ORTHOPAEDIC SURGERY

## 2020-03-13 NOTE — PROGRESS NOTES
Assessment/Plan:  1  Nontraumatic incomplete tear of right rotator cuff         Scribe Attestation    I,:   Kerri Jordan am acting as a scribe while in the presence of the attending physician :        I,:   Joe Burns MD personally performed the services described in this documentation    as scribed in my presence :              Marilyn Darby upon examination and review of the MRI of the right shoulder does demonstrate a small full-thickness tear of the supraspinatus tendon with no evidence of retraction, or muscle atrophy  I did discuss the previously discussed procedure of right shoulder arthroscopy with rotator cuff repair, and subacromial decompression  I did note to him that this would require 6 weeks total immobilization in a postoperative sling, and he will begin physical therapy 2 weeks after the surgery  With expectation of physical therapy for 3-4 months postoperatively  I did note to him that also take approximately 4 months until he is able to return back to doing light normal activities of daily living  With a 12 month full recovery of strength  I did discuss the procedure and associated risks including but not limited to bleeding, infection, nerve injury resulting weakness and pain, blood clots, increased pain, stiffness, failure surgery, recurrent injury, and need for further surgery  Marilyn Darby did verbalize understanding to all the information provided to him today, and wished to provide signed consent for a right shoulder arthroscopy with rotator cuff repair, and subacromial decompression  He will be fit with a postoperative sling by my DME fitter today, and will meet with my surgical scheduler to set up a date and time as per my schedule  I will see Marilyn Darby back on date of surgery  Subjective:   Reyna Neely is a 71 y o  male who presents to the office today for follow-up evaluation of his right shoulder  He has had on shoulder pain since December 2019   He states that overhead reaching activities continues to exacerbates painful symptoms and continues to have pain and numbness radiating along the ulnar aspect of his forearm into his small finger, and ring finger  He has completed physical therapy which did not provide him with any significant relief  He does also have an MRI demonstrated a small  full-thickness tear of the supraspinatus tendon  He states that he has difficulty with activities of daily living, and at last visit did discuss a rotator cuff repair performed arthroscopically in the shoulder however wanted to think about his decision prior to signing consent  Today he wishes to discuss the right shoulder arthroscopy with rotator cuff repair, and subacromial decompression procedure  Review of Systems   Constitutional: Negative for chills, fever and unexpected weight change  HENT: Negative for hearing loss, nosebleeds and sore throat  Eyes: Negative for pain, redness and visual disturbance  Respiratory: Negative for cough, shortness of breath and wheezing  Cardiovascular: Negative for chest pain, palpitations and leg swelling  Gastrointestinal: Negative for abdominal pain, nausea and vomiting  Endocrine: Negative for polyphagia and polyuria  Genitourinary: Negative for dysuria and hematuria  Musculoskeletal: Positive for arthralgias and myalgias  See HPI   Skin: Negative for rash and wound  Neurological: Negative for dizziness, numbness and headaches  Psychiatric/Behavioral: Negative for decreased concentration and suicidal ideas  The patient is not nervous/anxious  Past Medical History:   Diagnosis Date    Hyperlipidemia     Hypertension        History reviewed  No pertinent surgical history      Family History   Problem Relation Age of Onset    Kidney failure Father     Hypertension Brother     No Known Problems Mother     No Known Problems Sister     No Known Problems Maternal Aunt     No Known Problems Maternal Uncle     No Known Problems Paternal Aunt     No Known Problems Paternal Uncle     No Known Problems Maternal Grandmother     No Known Problems Maternal Grandfather     No Known Problems Paternal Grandmother     No Known Problems Paternal Grandfather        Social History     Occupational History    Not on file   Tobacco Use    Smoking status: Never Smoker    Smokeless tobacco: Never Used   Substance and Sexual Activity    Alcohol use: Yes     Comment: occ    Drug use: No    Sexual activity: Not on file         Current Outpatient Medications:     amLODIPine (NORVASC) 10 mg tablet, TAKE 1 TABLET BY MOUTH ONCE DAILY, Disp: 30 tablet, Rfl: 0    atorvastatin (LIPITOR) 10 mg tablet, Take 1 tablet by mouth once daily, Disp: 30 tablet, Rfl: 0    calcium carbonate (OS-C[place]AND) 600 MG tablet, Take 600 mg by mouth 2 (two) times a day with meals, Disp: , Rfl:     glucosamine 500 MG CAPS capsule, Take 500 mg by mouth, Disp: , Rfl:     Menaquinone-7 (VITAMIN K2 PO), Take by mouth, Disp: , Rfl:     Omega-3 Fatty Acids (FISH OIL) 1,000 mg, Take 1,000 mg by mouth daily, Disp: , Rfl:     PARoxetine (PAXIL) 20 mg tablet, Take 20 mg by mouth daily, Disp: , Rfl:     tamsulosin (FLOMAX) 0 4 mg, Take 0 8 mg by mouth daily, Disp: , Rfl: 11    telmisartan-hydrochlorothiazide (MICARDIS HCT) 80-12 5 MG per tablet, Take 1 tablet by mouth daily, Disp: 30 tablet, Rfl: 5    Allergies   Allergen Reactions    Tadalafil      In error         Objective:  Vitals:    03/13/20 1508   BP: 109/63   Pulse: 75       Right Shoulder Exam     Tenderness   The patient is experiencing no tenderness      Range of Motion   Active abduction: 130   External rotation: 40   Internal rotation 0 degrees: L5     Muscle Strength   Abduction: 3/5   Internal rotation: 5/5   External rotation: 5/5     Tests   Bruno test: positive  Impingement: positive    Other   Erythema: absent  Scars: absent  Sensation: normal  Pulse: present            Physical Exam   Constitutional: He is oriented to person, place, and time  He appears well-developed and well-nourished  HENT:   Head: Normocephalic and atraumatic  Eyes: Conjunctivae are normal  Right eye exhibits no discharge  Left eye exhibits no discharge  Neck: Normal range of motion  Neck supple  Cardiovascular: Normal rate and intact distal pulses  Pulmonary/Chest: Effort normal  No respiratory distress  Neurological: He is alert and oriented to person, place, and time  Skin: Skin is warm and dry  Psychiatric: He has a normal mood and affect  His behavior is normal  Judgment and thought content normal    Vitals reviewed  I have personally reviewed pertinent films in PACS and my interpretation is as follows:    MRI of the right shoulder demonstrates a small full thickness tear if the anterior edge of the supraspinatus  No evidence of retraction or muscle atrophy  Mild tendinosis without tear of the biceps long head  Moderate subacromial bursitis is also demonstrate

## 2020-03-15 DIAGNOSIS — I10 ESSENTIAL HYPERTENSION: ICD-10-CM

## 2020-03-16 DIAGNOSIS — I10 ESSENTIAL HYPERTENSION: ICD-10-CM

## 2020-03-16 RX ORDER — AMLODIPINE BESYLATE 10 MG/1
TABLET ORAL
Qty: 30 TABLET | Refills: 0 | Status: SHIPPED | OUTPATIENT
Start: 2020-03-16 | End: 2020-03-17

## 2020-03-17 ENCOUNTER — TRANSCRIBE ORDERS (OUTPATIENT)
Dept: ADMINISTRATIVE | Facility: HOSPITAL | Age: 65
End: 2020-03-17

## 2020-03-17 ENCOUNTER — APPOINTMENT (OUTPATIENT)
Dept: LAB | Facility: HOSPITAL | Age: 65
End: 2020-03-17
Attending: ORTHOPAEDIC SURGERY
Payer: COMMERCIAL

## 2020-03-17 DIAGNOSIS — M75.111 NONTRAUMATIC INCOMPLETE TEAR OF RIGHT ROTATOR CUFF: ICD-10-CM

## 2020-03-17 DIAGNOSIS — Z01.812 PRE-OPERATIVE LABORATORY EXAMINATION: ICD-10-CM

## 2020-03-17 LAB
ANION GAP SERPL CALCULATED.3IONS-SCNC: 5 MMOL/L (ref 4–13)
APTT PPP: 29 SECONDS (ref 23–37)
BASOPHILS # BLD AUTO: 0.02 THOUSANDS/ΜL (ref 0–0.1)
BASOPHILS NFR BLD AUTO: 1 % (ref 0–1)
BUN SERPL-MCNC: 24 MG/DL (ref 5–25)
CALCIUM SERPL-MCNC: 9.1 MG/DL (ref 8.3–10.1)
CHLORIDE SERPL-SCNC: 105 MMOL/L (ref 100–108)
CO2 SERPL-SCNC: 30 MMOL/L (ref 21–32)
CREAT SERPL-MCNC: 0.91 MG/DL (ref 0.6–1.3)
EOSINOPHIL # BLD AUTO: 0.12 THOUSAND/ΜL (ref 0–0.61)
EOSINOPHIL NFR BLD AUTO: 3 % (ref 0–6)
ERYTHROCYTE [DISTWIDTH] IN BLOOD BY AUTOMATED COUNT: 12 % (ref 11.6–15.1)
GFR SERPL CREATININE-BSD FRML MDRD: 88 ML/MIN/1.73SQ M
GLUCOSE P FAST SERPL-MCNC: 141 MG/DL (ref 65–99)
HCT VFR BLD AUTO: 43.4 % (ref 36.5–49.3)
HGB BLD-MCNC: 14 G/DL (ref 12–17)
IMM GRANULOCYTES # BLD AUTO: 0.01 THOUSAND/UL (ref 0–0.2)
IMM GRANULOCYTES NFR BLD AUTO: 0 % (ref 0–2)
INR PPP: 0.92 (ref 0.84–1.19)
LYMPHOCYTES # BLD AUTO: 1.96 THOUSANDS/ΜL (ref 0.6–4.47)
LYMPHOCYTES NFR BLD AUTO: 46 % (ref 14–44)
MCH RBC QN AUTO: 31.3 PG (ref 26.8–34.3)
MCHC RBC AUTO-ENTMCNC: 32.3 G/DL (ref 31.4–37.4)
MCV RBC AUTO: 97 FL (ref 82–98)
MONOCYTES # BLD AUTO: 0.49 THOUSAND/ΜL (ref 0.17–1.22)
MONOCYTES NFR BLD AUTO: 12 % (ref 4–12)
NEUTROPHILS # BLD AUTO: 1.58 THOUSANDS/ΜL (ref 1.85–7.62)
NEUTS SEG NFR BLD AUTO: 38 % (ref 43–75)
NRBC BLD AUTO-RTO: 0 /100 WBCS
PLATELET # BLD AUTO: 233 THOUSANDS/UL (ref 149–390)
PMV BLD AUTO: 10.1 FL (ref 8.9–12.7)
POTASSIUM SERPL-SCNC: 3.6 MMOL/L (ref 3.5–5.3)
PROTHROMBIN TIME: 12.6 SECONDS (ref 11.6–14.5)
RBC # BLD AUTO: 4.48 MILLION/UL (ref 3.88–5.62)
SODIUM SERPL-SCNC: 140 MMOL/L (ref 136–145)
WBC # BLD AUTO: 4.18 THOUSAND/UL (ref 4.31–10.16)

## 2020-03-17 PROCEDURE — 85610 PROTHROMBIN TIME: CPT

## 2020-03-17 PROCEDURE — 36415 COLL VENOUS BLD VENIPUNCTURE: CPT

## 2020-03-17 PROCEDURE — 80048 BASIC METABOLIC PNL TOTAL CA: CPT

## 2020-03-17 PROCEDURE — 85025 COMPLETE CBC W/AUTO DIFF WBC: CPT

## 2020-03-17 PROCEDURE — 85730 THROMBOPLASTIN TIME PARTIAL: CPT

## 2020-03-17 RX ORDER — AMLODIPINE BESYLATE 10 MG/1
TABLET ORAL
Qty: 30 TABLET | Refills: 0 | Status: SHIPPED | OUTPATIENT
Start: 2020-03-17 | End: 2020-05-26

## 2020-03-19 ENCOUNTER — TELEPHONE (OUTPATIENT)
Dept: CARDIOLOGY CLINIC | Facility: CLINIC | Age: 65
End: 2020-03-19

## 2020-03-19 ENCOUNTER — HOSPITAL ENCOUNTER (OUTPATIENT)
Dept: RADIOLOGY | Facility: HOSPITAL | Age: 65
Discharge: HOME | End: 2020-03-19
Payer: COMMERCIAL

## 2020-03-19 ENCOUNTER — TELEPHONE (OUTPATIENT)
Dept: OBGYN CLINIC | Facility: CLINIC | Age: 65
End: 2020-03-19

## 2020-03-19 DIAGNOSIS — R42 DIZZINESS: ICD-10-CM

## 2020-03-19 DIAGNOSIS — I10 ESSENTIAL HYPERTENSION, MALIGNANT: ICD-10-CM

## 2020-03-19 PROCEDURE — 93880 EXTRACRANIAL BILAT STUDY: CPT

## 2020-03-19 PROCEDURE — 93880 EXTRACRANIAL BILAT STUDY: CPT | Performed by: SURGERY

## 2020-03-19 NOTE — TELEPHONE ENCOUNTER
Basim Sweeney stopped into office to go over pre op clearance instructions and to discuss disability paperwork  Also he is concerned about having to postpone his surgery on 4/23  I explained that as of right now, his surgery is still scheduled, however, if we would need to reschedule, Dr Kareen Egan would give him a call

## 2020-03-19 NOTE — TELEPHONE ENCOUNTER
Patient needs a cardiac clearance, has an apt for 4/21 with Dr Martha Ross  Would like to see if she could get in sooner  Can you please call pt

## 2020-03-27 ENCOUNTER — TELEPHONE (OUTPATIENT)
Dept: OBGYN CLINIC | Facility: HOSPITAL | Age: 65
End: 2020-03-27

## 2020-04-06 ENCOUNTER — TELEPHONE (OUTPATIENT)
Dept: OBGYN CLINIC | Facility: CLINIC | Age: 65
End: 2020-04-06

## 2020-04-08 ENCOUNTER — TELEPHONE (OUTPATIENT)
Dept: OBGYN CLINIC | Facility: CLINIC | Age: 65
End: 2020-04-08

## 2020-05-03 DIAGNOSIS — E78.5 DYSLIPIDEMIA: ICD-10-CM

## 2020-05-03 RX ORDER — ATORVASTATIN CALCIUM 10 MG/1
TABLET, FILM COATED ORAL
Qty: 30 TABLET | Refills: 0 | Status: SHIPPED | OUTPATIENT
Start: 2020-05-03 | End: 2020-06-21

## 2020-05-07 ENCOUNTER — TELEPHONE (OUTPATIENT)
Dept: OBGYN CLINIC | Facility: CLINIC | Age: 65
End: 2020-05-07

## 2020-05-25 DIAGNOSIS — I10 ESSENTIAL HYPERTENSION: ICD-10-CM

## 2020-05-26 RX ORDER — AMLODIPINE BESYLATE 10 MG/1
TABLET ORAL
Qty: 30 TABLET | Refills: 0 | Status: SHIPPED | OUTPATIENT
Start: 2020-05-26 | End: 2020-06-30

## 2020-06-21 DIAGNOSIS — E78.5 DYSLIPIDEMIA: ICD-10-CM

## 2020-06-21 RX ORDER — ATORVASTATIN CALCIUM 10 MG/1
TABLET, FILM COATED ORAL
Qty: 30 TABLET | Refills: 0 | Status: SHIPPED | OUTPATIENT
Start: 2020-06-21 | End: 2020-09-01

## 2020-06-29 DIAGNOSIS — I10 ESSENTIAL HYPERTENSION: ICD-10-CM

## 2020-06-30 RX ORDER — AMLODIPINE BESYLATE 10 MG/1
TABLET ORAL
Qty: 30 TABLET | Refills: 0 | Status: SHIPPED | OUTPATIENT
Start: 2020-06-30 | End: 2020-07-03

## 2020-07-01 DIAGNOSIS — I10 ESSENTIAL HYPERTENSION: ICD-10-CM

## 2020-07-03 RX ORDER — AMLODIPINE BESYLATE 10 MG/1
TABLET ORAL
Qty: 30 TABLET | Refills: 0 | Status: SHIPPED | OUTPATIENT
Start: 2020-07-03 | End: 2020-07-27

## 2020-07-27 DIAGNOSIS — I10 ESSENTIAL HYPERTENSION: ICD-10-CM

## 2020-07-27 RX ORDER — AMLODIPINE BESYLATE 10 MG/1
TABLET ORAL
Qty: 30 TABLET | Refills: 0 | Status: SHIPPED | OUTPATIENT
Start: 2020-07-27 | End: 2020-08-22

## 2020-08-22 DIAGNOSIS — I10 ESSENTIAL HYPERTENSION: ICD-10-CM

## 2020-08-22 RX ORDER — AMLODIPINE BESYLATE 10 MG/1
TABLET ORAL
Qty: 30 TABLET | Refills: 0 | Status: SHIPPED | OUTPATIENT
Start: 2020-08-22 | End: 2020-09-23 | Stop reason: SDUPTHER

## 2020-08-31 DIAGNOSIS — E78.5 DYSLIPIDEMIA: ICD-10-CM

## 2020-09-01 RX ORDER — ATORVASTATIN CALCIUM 10 MG/1
TABLET, FILM COATED ORAL
Qty: 30 TABLET | Refills: 0 | Status: SHIPPED | OUTPATIENT
Start: 2020-09-01 | End: 2020-09-08

## 2020-09-07 DIAGNOSIS — E78.5 DYSLIPIDEMIA: ICD-10-CM

## 2020-09-08 RX ORDER — ATORVASTATIN CALCIUM 10 MG/1
TABLET, FILM COATED ORAL
Qty: 30 TABLET | Refills: 0 | Status: SHIPPED | OUTPATIENT
Start: 2020-09-08 | End: 2020-09-23 | Stop reason: SDUPTHER

## 2020-09-13 DIAGNOSIS — I10 ESSENTIAL HYPERTENSION: ICD-10-CM

## 2020-09-13 RX ORDER — TELMISARTAN AND HYDROCHLORTHIAZIDE 80; 12.5 MG/1; MG/1
TABLET ORAL
Qty: 30 TABLET | Refills: 0 | Status: SHIPPED | OUTPATIENT
Start: 2020-09-13 | End: 2020-09-21

## 2020-09-14 LAB
HBA1C MFR BLD HPLC: 5.9 %
HCV AB SER-ACNC: <0.1

## 2020-09-21 DIAGNOSIS — I10 ESSENTIAL HYPERTENSION: ICD-10-CM

## 2020-09-21 RX ORDER — TELMISARTAN AND HYDROCHLORTHIAZIDE 80; 12.5 MG/1; MG/1
TABLET ORAL
Qty: 30 TABLET | Refills: 0 | Status: SHIPPED | OUTPATIENT
Start: 2020-09-21 | End: 2020-09-23 | Stop reason: SDUPTHER

## 2020-09-21 NOTE — PROGRESS NOTES
Progress Note - Cardiology Office  [facility]    Aurelio Brito 76 y o  male MRN: [id number]  : 1955  Encounter: [id number]      Assessment:     1  Essential hypertension    2  Dyslipidemia    3  Cardiac murmur    4  Mitral valve prolapse of posterior leaflet with moderate to severe regurgitation    5  Anxiety        Discussion Summary and Plan:  1  Hypertension  Patient has longstanding history of essential hypertension  Presently well controlled with amlodipine and Micardis HCT  Patient has blood test done early in September they were acceptable labs reviewed with them  2   Cardiac murmur  Previous echo Doppler shows at least moderate mitral regurgitation reviewed by me  Patient is known to have procedure vital lab prolapse  Will repeat echo Doppler  3  Dyslipidemia  Cholesterol profile reviewed  Labs discussed with them  Increase Lipitor 10 mg   Class cholesterol was slightly elevated LDL I would prefer to be below 100      4  Anxiety  Patient was reassured  Advised him to continue current medications  He is pretty active at his job he walked 12,000 steps today  5  Mitral valve prolapse with at least moderate mitral regurgitation  LV size was okay on last echo repeat echo Doppler ordered  Follow-up in 6 months    Counseling :  A description of the counseling  Goals and Barriers  Patient's ability to self care: Yes  Medication side effect reviewed with patient in detail and all their questions answered to their satisfaction  HPI :     Aurelio Brito is a 76y o  year old male who came for follow up  Patient has history of hypertension hyperlipidemia benign prostate hypertrophy who was initially seen by me few years ago for hypertension  His cholesterol was previously diet controlled and has been doing well but over the year due to his age now his risk is higher and he is willing to go on statin therapy  He denies any chest pain or any shortness of breath  No fever no chills no PND no orthopnea no other significant complaint  09/23/2020  Above reviewed  Patient came follow-up he is doing well his blood pressure is pretty well controlled  He had a blood test done on 09/15/2020  Most of the labs were acceptable cholesterol slightly high with   LFTs were acceptable he was taking Lipitor 5 mg  He brought his diary of blood pressure most of the blood pressure readings are acceptable blood pressure here is also acceptable no dizziness no lightheadedness  EKG shows heart rate 62 beats per minute no significant ST changes  No nausea no vomiting no fever no PND no orthopnea no other issues  He is trying to walk 15 miles a week  Patient is known to have mitral regurgitation with moderate to severe in great secondary to mitral valve prolapse of posterior leaflet        Review of Systems   Constitutional: Negative for activity change, chills, diaphoresis, fever and unexpected weight change  HENT: Negative for congestion  Eyes: Negative for discharge and redness  Respiratory: Negative for cough, chest tightness, shortness of breath and wheezing  Cardiovascular: Negative  Negative for chest pain, palpitations and leg swelling  Gastrointestinal: Negative for abdominal pain, diarrhea and nausea  Endocrine: Negative  Genitourinary: Negative for decreased urine volume and urgency  Musculoskeletal: Negative  Negative for arthralgias, back pain and gait problem  Skin: Negative for rash and wound  Allergic/Immunologic: Negative  Neurological: Negative for dizziness, seizures, syncope, weakness, light-headedness and headaches  Hematological: Negative  Psychiatric/Behavioral: Negative for agitation and confusion  The patient is not nervous/anxious  Historical Information   Past Medical History:   Diagnosis Date    Hyperlipidemia     Hypertension      History reviewed  No pertinent surgical history    Social History Substance and Sexual Activity   Alcohol Use Yes    Comment: occ     Social History     Substance and Sexual Activity   Drug Use No     Social History     Tobacco Use   Smoking Status Never Smoker   Smokeless Tobacco Never Used     Family History:   Family History   Problem Relation Age of Onset    Kidney failure Father     Hypertension Brother     No Known Problems Mother     No Known Problems Sister     No Known Problems Maternal Aunt     No Known Problems Maternal Uncle     No Known Problems Paternal Aunt     No Known Problems Paternal Uncle     No Known Problems Maternal Grandmother     No Known Problems Maternal Grandfather     No Known Problems Paternal Grandmother     No Known Problems Paternal Grandfather        Meds/Allergies     No Known Allergies    Current Outpatient Medications:     atorvastatin (LIPITOR) 10 mg tablet, Take 1 tablet (10 mg total) by mouth daily, Disp: 90 tablet, Rfl: 3    glucosamine 500 MG CAPS capsule, Take 500 mg by mouth, Disp: , Rfl:     amLODIPine (NORVASC) 10 mg tablet, Take 1 tablet by mouth once daily, Disp: 30 tablet, Rfl: 0    amLODIPine (NORVASC) 10 mg tablet, Take 1 tablet (10 mg total) by mouth daily, Disp: 90 tablet, Rfl: 3    calcium carbonate (OS-CLEVELAND) 600 MG tablet, Take 600 mg by mouth 2 (two) times a day with meals, Disp: , Rfl:     Menaquinone-7 (VITAMIN K2 PO), Take by mouth, Disp: , Rfl:     Omega-3 Fatty Acids (FISH OIL) 1,000 mg, Take 1,000 mg by mouth daily, Disp: , Rfl:     PARoxetine (PAXIL) 20 mg tablet, Take 20 mg by mouth daily, Disp: , Rfl:     tamsulosin (FLOMAX) 0 4 mg, Take 0 8 mg by mouth daily, Disp: , Rfl: 11    telmisartan-hydrochlorothiazide (MICARDIS HCT) 80-12 5 MG per tablet, Take 1 tablet by mouth daily, Disp: 90 tablet, Rfl: 3    Vitals: Blood pressure 100/60, pulse 62, temperature 98 °F (36 7 °C), temperature source Temporal, height 5' 4" (1 626 m), weight 62 1 kg (137 lb), SpO2 97 %      Body mass index is 23 52 kg/m²  Vitals:    09/23/20 1355   Weight: 62 1 kg (137 lb)     BP Readings from Last 3 Encounters:   09/23/20 100/60   03/13/20 109/63   02/12/20 120/69         Physical Exam   Constitutional: He is oriented to person, place, and time  He appears well-developed and well-nourished  No distress  HENT:   Head: Normocephalic and atraumatic  Eyes: Pupils are equal, round, and reactive to light  Neck: Neck supple  No JVD present  No tracheal deviation present  No thyromegaly present  Cardiovascular: Normal rate, regular rhythm, S1 normal and S2 normal  Exam reveals no gallop, no S3, no S4, no distant heart sounds and no friction rub  Murmur heard  Systolic (ejection) murmur is present with a grade of 2/6  S1-S2 regular with pansystolic 3/6 murmur  Pulmonary/Chest: Effort normal and breath sounds normal  No respiratory distress  He has no wheezes  He has no rales  He exhibits no tenderness  Abdominal: Soft  Bowel sounds are normal  He exhibits no distension  There is no abdominal tenderness  Musculoskeletal:         General: No deformity or edema  Neurological: He is alert and oriented to person, place, and time  Skin: Skin is warm and dry  No rash noted  He is not diaphoretic  No pallor  Psychiatric: He has a normal mood and affect  His behavior is normal  Judgment normal         Diagnostic Studies Review Cardio:  Lab Review: Patient has blood test done outside  Cholesterol was 147 triglyceride 52 HDL 60 LDL 76  LFTs are acceptable  Hemoglobin 14 4 hematocrit 45  HbA1c 6 0  LFTs were acceptable  Potassium 3 8 sodium 141  Stress Test: Nuclear stress test in December 2015 shows no ischemia EF was 60%  Echocardiogram/TAMIKA: Echo Doppler in 7533 shows a systolic function, mild MR, and a repeat echo in January 2017 shows normal systolic function with no significant change  Mild MR was noted        repeat echo Doppler    ECG Report: Twelve-lead EKG done 4/3/2017 shows normal sinus done no significant   ST changes no change from old EKG     Repeat EKG shows normal sinus rhythm heart rate 58 beats per minute  No significant ST changes  High voltage may be due to thin chest     Twelve lead EKG done in our office 01/21/2019 shows normal sinus rhythm LVH by voltage  No other significant ST changes  Twelve lead EKG 10/11/2019 shows normal sinus rhythm heart rate 81 beats per minute  Twelve lead EKG done 09/23/2020 shows normal sinus moderate voltage for LVH no change from old EKG  His blood work done with his primary care doctor on 09/15/2020 which was reviewed acceptable  HbA1c was 5 9, TSH 1 2,  cholesterol 195 LFTs and creatinine was acceptable  Creatinine was 1 02     Dr Surinder Pimentel MD [facility] [place]      "This note has been constructed using a voice recognition system  Therefore there may be syntax, spelling, and/or grammatical errors   Please call if you have any questions  "

## 2020-09-23 ENCOUNTER — OFFICE VISIT (OUTPATIENT)
Dept: CARDIOLOGY CLINIC | Facility: CLINIC | Age: 65
End: 2020-09-23
Payer: COMMERCIAL

## 2020-09-23 VITALS
OXYGEN SATURATION: 97 % | HEIGHT: 64 IN | WEIGHT: 137 LBS | HEART RATE: 62 BPM | TEMPERATURE: 98 F | SYSTOLIC BLOOD PRESSURE: 100 MMHG | BODY MASS INDEX: 23.39 KG/M2 | DIASTOLIC BLOOD PRESSURE: 60 MMHG

## 2020-09-23 DIAGNOSIS — F41.9 ANXIETY: ICD-10-CM

## 2020-09-23 DIAGNOSIS — I34.1 MITRAL VALVE PROLAPSE: ICD-10-CM

## 2020-09-23 DIAGNOSIS — I10 ESSENTIAL HYPERTENSION: ICD-10-CM

## 2020-09-23 DIAGNOSIS — R01.1 CARDIAC MURMUR: ICD-10-CM

## 2020-09-23 DIAGNOSIS — E78.5 DYSLIPIDEMIA: ICD-10-CM

## 2020-09-23 PROCEDURE — 99214 OFFICE O/P EST MOD 30 MIN: CPT | Performed by: INTERNAL MEDICINE

## 2020-09-23 PROCEDURE — 93000 ELECTROCARDIOGRAM COMPLETE: CPT | Performed by: INTERNAL MEDICINE

## 2020-09-23 RX ORDER — TELMISARTAN AND HYDROCHLORTHIAZIDE 80; 12.5 MG/1; MG/1
1 TABLET ORAL DAILY
Qty: 90 TABLET | Refills: 3 | Status: SHIPPED | OUTPATIENT
Start: 2020-09-23 | End: 2020-10-15 | Stop reason: SDUPTHER

## 2020-09-23 RX ORDER — AMLODIPINE BESYLATE 10 MG/1
10 TABLET ORAL DAILY
Qty: 90 TABLET | Refills: 3 | Status: SHIPPED | OUTPATIENT
Start: 2020-09-23 | End: 2020-11-19 | Stop reason: SDUPTHER

## 2020-09-23 RX ORDER — AMLODIPINE BESYLATE 10 MG/1
TABLET ORAL
Qty: 30 TABLET | Refills: 0 | Status: SHIPPED | OUTPATIENT
Start: 2020-09-23 | End: 2020-09-23

## 2020-09-23 RX ORDER — ATORVASTATIN CALCIUM 10 MG/1
10 TABLET, FILM COATED ORAL DAILY
Qty: 90 TABLET | Refills: 3 | Status: SHIPPED | OUTPATIENT
Start: 2020-09-23 | End: 2020-10-15 | Stop reason: SDUPTHER

## 2020-10-15 DIAGNOSIS — E78.5 DYSLIPIDEMIA: ICD-10-CM

## 2020-10-15 DIAGNOSIS — I10 ESSENTIAL HYPERTENSION: ICD-10-CM

## 2020-10-15 RX ORDER — TELMISARTAN AND HYDROCHLORTHIAZIDE 80; 12.5 MG/1; MG/1
1 TABLET ORAL DAILY
Qty: 90 TABLET | Refills: 3 | Status: SHIPPED | OUTPATIENT
Start: 2020-10-15 | End: 2020-11-19 | Stop reason: SDUPTHER

## 2020-10-15 RX ORDER — ATORVASTATIN CALCIUM 10 MG/1
10 TABLET, FILM COATED ORAL DAILY
Qty: 90 TABLET | Refills: 3 | Status: SHIPPED | OUTPATIENT
Start: 2020-10-15 | End: 2020-11-19 | Stop reason: SDUPTHER

## 2020-11-19 DIAGNOSIS — E78.5 DYSLIPIDEMIA: ICD-10-CM

## 2020-11-19 DIAGNOSIS — I10 ESSENTIAL HYPERTENSION: ICD-10-CM

## 2020-11-19 RX ORDER — ATORVASTATIN CALCIUM 10 MG/1
10 TABLET, FILM COATED ORAL DAILY
Qty: 90 TABLET | Refills: 3 | Status: SHIPPED | OUTPATIENT
Start: 2020-11-19 | End: 2021-11-12 | Stop reason: SDUPTHER

## 2020-11-19 RX ORDER — AMLODIPINE BESYLATE 10 MG/1
10 TABLET ORAL DAILY
Qty: 90 TABLET | Refills: 3 | Status: SHIPPED | OUTPATIENT
Start: 2020-11-19 | End: 2020-11-30

## 2020-11-19 RX ORDER — TELMISARTAN AND HYDROCHLORTHIAZIDE 80; 12.5 MG/1; MG/1
1 TABLET ORAL DAILY
Qty: 90 TABLET | Refills: 3 | Status: SHIPPED | OUTPATIENT
Start: 2020-11-19 | End: 2021-11-12 | Stop reason: SDUPTHER

## 2020-11-29 DIAGNOSIS — I10 ESSENTIAL HYPERTENSION: ICD-10-CM

## 2020-11-30 RX ORDER — AMLODIPINE BESYLATE 10 MG/1
TABLET ORAL
Qty: 30 TABLET | Refills: 0 | Status: SHIPPED | OUTPATIENT
Start: 2020-11-30 | End: 2021-11-12 | Stop reason: SDUPTHER

## 2021-03-27 LAB — HBA1C MFR BLD HPLC: 5.9 %

## 2021-04-20 ENCOUNTER — OFFICE VISIT (OUTPATIENT)
Dept: CARDIOLOGY CLINIC | Facility: CLINIC | Age: 66
End: 2021-04-20
Payer: MEDICARE

## 2021-04-20 VITALS
BODY MASS INDEX: 24.59 KG/M2 | WEIGHT: 144 LBS | SYSTOLIC BLOOD PRESSURE: 120 MMHG | TEMPERATURE: 97.7 F | DIASTOLIC BLOOD PRESSURE: 76 MMHG | HEART RATE: 63 BPM | OXYGEN SATURATION: 96 % | HEIGHT: 64 IN

## 2021-04-20 DIAGNOSIS — E78.5 DYSLIPIDEMIA: ICD-10-CM

## 2021-04-20 DIAGNOSIS — I10 ESSENTIAL HYPERTENSION: ICD-10-CM

## 2021-04-20 DIAGNOSIS — R01.1 CARDIAC MURMUR: ICD-10-CM

## 2021-04-20 DIAGNOSIS — F41.9 ANXIETY: ICD-10-CM

## 2021-04-20 DIAGNOSIS — I34.1 MITRAL VALVE PROLAPSE: ICD-10-CM

## 2021-04-20 PROCEDURE — 93000 ELECTROCARDIOGRAM COMPLETE: CPT | Performed by: INTERNAL MEDICINE

## 2021-04-20 PROCEDURE — 99214 OFFICE O/P EST MOD 30 MIN: CPT | Performed by: INTERNAL MEDICINE

## 2021-04-20 RX ORDER — SILODOSIN 8 MG/1
CAPSULE ORAL
COMMUNITY

## 2021-04-20 RX ORDER — MELATONIN
1000 DAILY
COMMUNITY
End: 2022-07-28

## 2021-04-20 NOTE — PROGRESS NOTES
Progress Note - Cardiology Office  [facility]    Cassie Young 76 y o  male MRN: [id number]  : 1955  Encounter: [id number]      Assessment:     1  Essential hypertension    2  Cardiac murmur    3  Dyslipidemia    4  Mitral valve prolapse of posterior leaflet with moderate to severe regurgitation    5  Anxiety        Discussion Summary and Plan:  1  Hypertension  Patient has longstanding history of essential hypertension  Presently well controlled with amlodipine and Micardis HCT  Patient blood test are stable  Labs from 2021 reviewed      2  Cardiac murmur  Previous echo Doppler shows at least moderate mitral regurgitation reviewed by me  Patient is known to have procedure vital lab prolapse  Patient did not do echo Doppler be ordered in 2020 will get echo as soon as possible    3  Dyslipidemia  Continue statin cholesterol profile now improved  LDL is now below 100  Labs done in 2021  LFTs were acceptable    4  Anxiety  Patient was reassured  Advised him to continue current medications  He is pretty active at his job he walked 12,000 steps today  5  Mitral valve prolapse with at least moderate mitral regurgitation  LV size was okay on last echo repeat echo Doppler ordered  Follow-up in 6 months    Counseling :  A description of the counseling  Goals and Barriers  Patient's ability to self care: Yes  Medication side effect reviewed with patient in detail and all their questions answered to their satisfaction  HPI :     Cassie Young is a 76y o  year old male who came for follow up  Patient has history of hypertension hyperlipidemia benign prostate hypertrophy who was initially seen by me few years ago for hypertension  His cholesterol was previously diet controlled and has been doing well but over the year due to his age now his risk is higher and he is willing to go on statin therapy  He denies any chest pain or any shortness of breath   No fever no chills no PND no orthopnea no other significant complaint  09/23/2020  Above reviewed  Patient came follow-up he is doing well his blood pressure is pretty well controlled  He had a blood test done on 09/15/2020  Most of the labs were acceptable cholesterol slightly high with   LFTs were acceptable he was taking Lipitor 5 mg  He brought his diary of blood pressure most of the blood pressure readings are acceptable blood pressure here is also acceptable no dizziness no lightheadedness  EKG shows heart rate 62 beats per minute no significant ST changes  No nausea no vomiting no fever no PND no orthopnea no other issues  He is trying to walk 15 miles a week  Patient is known to have mitral regurgitation with moderate to severe in great secondary to mitral valve prolapse of posterior leaflet    04/20/2021  Above reviewed  Patient came for follow-up he is doing well his blood pressure is pretty well controlled  He had a blood test done on March 27 which were acceptable  HbA1c 5 9  Cholesterol is also acceptable is 161, LDL is 78 with HDL 69  Electrolytes were acceptable  He had history of moderate to severe mitral regurgitation due to mitral valve prolapse  He denies any chest pain any shortness of breath no dizziness no lightheadedness no other cardiovascular issues at this time  Review of Systems   Constitutional: Negative for activity change, chills, diaphoresis, fever and unexpected weight change  HENT: Negative for congestion  Eyes: Negative for discharge and redness  Respiratory: Negative for cough, chest tightness, shortness of breath and wheezing  Cardiovascular: Negative  Negative for chest pain, palpitations and leg swelling  Gastrointestinal: Negative for abdominal pain, diarrhea and nausea  Endocrine: Negative  Genitourinary: Negative for decreased urine volume and urgency  Musculoskeletal: Negative    Negative for arthralgias, back pain and gait problem  Skin: Negative for rash and wound  Allergic/Immunologic: Negative  Neurological: Negative for dizziness, seizures, syncope, weakness, light-headedness and headaches  Hematological: Negative  Psychiatric/Behavioral: Negative for agitation and confusion  The patient is not nervous/anxious  Historical Information   Past Medical History:   Diagnosis Date    Hyperlipidemia     Hypertension      History reviewed  No pertinent surgical history    Social History     Substance and Sexual Activity   Alcohol Use Yes    Comment: occ     Social History     Substance and Sexual Activity   Drug Use No     Social History     Tobacco Use   Smoking Status Never Smoker   Smokeless Tobacco Never Used     Family History:   Family History   Problem Relation Age of Onset    Kidney failure Father     Hypertension Brother     No Known Problems Mother     No Known Problems Sister     No Known Problems Maternal Aunt     No Known Problems Maternal Uncle     No Known Problems Paternal Aunt     No Known Problems Paternal Uncle     No Known Problems Maternal Grandmother     No Known Problems Maternal Grandfather     No Known Problems Paternal Grandmother     No Known Problems Paternal Grandfather        Meds/Allergies     No Known Allergies    Current Outpatient Medications:     amLODIPine (NORVASC) 10 mg tablet, TAKE ONE TABLET BY MOUTH ONCE DAILY, Disp: 30 tablet, Rfl: 0    atorvastatin (LIPITOR) 10 mg tablet, Take 1 tablet (10 mg total) by mouth daily, Disp: 90 tablet, Rfl: 3    cholecalciferol (VITAMIN D3) 1,000 units tablet, Take 1,000 Units by mouth daily, Disp: , Rfl:     glucosamine 500 MG CAPS capsule, Take 500 mg by mouth, Disp: , Rfl:     Silodosin 8 MG CAPS, Take by mouth, Disp: , Rfl:     telmisartan-hydrochlorothiazide (MICARDIS HCT) 80-12 5 MG per tablet, Take 1 tablet by mouth daily, Disp: 90 tablet, Rfl: 3    Zinc Sulfate (ZINC 15 PO), Take by mouth, Disp: , Rfl:     calcium carbonate (OS-CLEVELAND) 600 MG tablet, Take 600 mg by mouth 2 (two) times a day with meals, Disp: , Rfl:     Menaquinone-7 (VITAMIN K2 PO), Take by mouth, Disp: , Rfl:     Omega-3 Fatty Acids (FISH OIL) 1,000 mg, Take 1,000 mg by mouth daily, Disp: , Rfl:     PARoxetine (PAXIL) 20 mg tablet, Take 20 mg by mouth daily, Disp: , Rfl:     tamsulosin (FLOMAX) 0 4 mg, Take 0 8 mg by mouth daily, Disp: , Rfl: 11    Vitals: Blood pressure 120/76, pulse 63, temperature 97 7 °F (36 5 °C), temperature source Temporal, height 5' 4" (1 626 m), weight 65 3 kg (144 lb), SpO2 96 %  Body mass index is 24 72 kg/m²  Vitals:    04/20/21 1032   Weight: 65 3 kg (144 lb)     BP Readings from Last 3 Encounters:   04/20/21 120/76   09/23/20 100/60   03/13/20 109/63         Physical Exam   Constitutional: He is oriented to person, place, and time  He appears well-developed and well-nourished  No distress  HENT:   Head: Normocephalic and atraumatic  Eyes: Pupils are equal, round, and reactive to light  Neck: Neck supple  No JVD present  No tracheal deviation present  No thyromegaly present  Cardiovascular: Normal rate, regular rhythm, S1 normal, S2 normal and intact distal pulses  Exam reveals no gallop, no S3, no S4, no distant heart sounds and no friction rub  Murmur heard  Systolic (ejection) murmur is present with a grade of 2/6  S1-S2 regular with harsh prolonged 3/6 pansystolic murmur radiating towards axilla   Pulmonary/Chest: Effort normal and breath sounds normal  No respiratory distress  He has no wheezes  He has no rales  He exhibits no tenderness  Abdominal: Soft  Bowel sounds are normal  He exhibits no distension  There is no abdominal tenderness  Musculoskeletal:         General: No deformity or edema  Neurological: He is alert and oriented to person, place, and time  Skin: Skin is warm and dry  No rash noted  He is not diaphoretic  No pallor  Psychiatric: He has a normal mood and affect   His behavior is normal  Judgment normal         Diagnostic Studies Review Cardio:  Lab Review: Patient has blood test done outside  Cholesterol was 147 triglyceride 52 HDL 60 LDL 76  LFTs are acceptable  Hemoglobin 14 4 hematocrit 45  HbA1c 6 0  LFTs were acceptable  Potassium 3 8 sodium 141  Stress Test: Nuclear stress test in December 2015 shows no ischemia EF was 60%  Echocardiogram/TAMIKA: Echo Doppler in 2473 shows a systolic function, mild MR, and a repeat echo in January 2017 shows normal systolic function with no significant change  Mild MR was noted  repeat echo Doppler    ECG Report: Twelve-lead EKG done 4/3/2017 shows normal sinus done no significant   ST changes no change from old EKG     Repeat EKG shows normal sinus rhythm heart rate 58 beats per minute  No significant ST changes  High voltage may be due to thin chest     Twelve lead EKG done in our office 01/21/2019 shows normal sinus rhythm LVH by voltage  No other significant ST changes  Twelve lead EKG 10/11/2019 shows normal sinus rhythm heart rate 81 beats per minute  Twelve lead EKG done 09/23/2020 shows normal sinus moderate voltage for LVH no change from old EKG  Twelve lead EKG done on 04/20/2021 shows sinus rhythm heart rate 63 beats per minute LVH by voltage nose change from previous EKG  His blood work done with his primary care doctor on 09/15/2020 which was reviewed acceptable  HbA1c was 5 9, TSH 1 2,  cholesterol 195 LFTs and creatinine was acceptable  Creatinine was 1 02     Dr Ari Olivier MD [facility] [place]      "This note has been constructed using a voice recognition system  Therefore there may be syntax, spelling, and/or grammatical errors   Please call if you have any questions  "

## 2021-05-14 ENCOUNTER — HOSPITAL ENCOUNTER (OUTPATIENT)
Dept: NON INVASIVE DIAGNOSTICS | Facility: HOSPITAL | Age: 66
Discharge: HOME | End: 2021-05-14
Attending: INTERNAL MEDICINE
Payer: MEDICARE

## 2021-05-14 DIAGNOSIS — I34.1 MITRAL VALVE PROLAPSE: ICD-10-CM

## 2021-05-14 DIAGNOSIS — E78.5 DYSLIPIDEMIA: ICD-10-CM

## 2021-05-14 DIAGNOSIS — R01.1 CARDIAC MURMUR: ICD-10-CM

## 2021-05-14 DIAGNOSIS — I10 ESSENTIAL HYPERTENSION: ICD-10-CM

## 2021-05-14 DIAGNOSIS — F41.9 ANXIETY: ICD-10-CM

## 2021-05-14 LAB
CHEST PAIN STATEMENT: NORMAL
MAX DIASTOLIC BP: 70 MMHG
MAX HEART RATE: 141 BPM
MAX PREDICTED HEART RATE: 153 BPM
MAX. SYSTOLIC BP: 170 MMHG
PROTOCOL NAME: NORMAL
REASON FOR TERMINATION: NORMAL
TARGET HR FORMULA: NORMAL
TEST INDICATION: NORMAL
TIME IN EXERCISE PHASE: NORMAL

## 2021-05-14 PROCEDURE — 93017 CV STRESS TEST TRACING ONLY: CPT

## 2021-05-15 PROCEDURE — 93018 CV STRESS TEST I&R ONLY: CPT | Performed by: INTERNAL MEDICINE

## 2021-05-15 PROCEDURE — 93016 CV STRESS TEST SUPVJ ONLY: CPT | Performed by: INTERNAL MEDICINE

## 2021-05-17 DIAGNOSIS — R94.39 ABNORMAL STRESS ECG WITH TREADMILL: ICD-10-CM

## 2021-05-17 DIAGNOSIS — E78.5 DYSLIPIDEMIA: ICD-10-CM

## 2021-05-17 DIAGNOSIS — I10 ESSENTIAL HYPERTENSION: Primary | ICD-10-CM

## 2021-06-01 ENCOUNTER — HOSPITAL ENCOUNTER (OUTPATIENT)
Dept: RADIOLOGY | Facility: HOSPITAL | Age: 66
Discharge: HOME | End: 2021-06-01
Attending: INTERNAL MEDICINE
Payer: MEDICARE

## 2021-06-01 ENCOUNTER — HOSPITAL ENCOUNTER (OUTPATIENT)
Dept: NON INVASIVE DIAGNOSTICS | Facility: HOSPITAL | Age: 66
Discharge: HOME | End: 2021-06-01
Attending: INTERNAL MEDICINE
Payer: MEDICARE

## 2021-06-01 DIAGNOSIS — I10 ESSENTIAL HYPERTENSION: ICD-10-CM

## 2021-06-01 DIAGNOSIS — E78.5 DYSLIPIDEMIA: ICD-10-CM

## 2021-06-01 DIAGNOSIS — R94.39 ABNORMAL STRESS ECG WITH TREADMILL: ICD-10-CM

## 2021-06-01 LAB
CHEST PAIN STATEMENT: NORMAL
MAX DIASTOLIC BP: 80 MMHG
MAX HEART RATE: 155 BPM
MAX PREDICTED HEART RATE: 153 BPM
MAX. SYSTOLIC BP: 174 MMHG
PROTOCOL NAME: NORMAL
REASON FOR TERMINATION: NORMAL
TARGET HR FORMULA: NORMAL
TEST INDICATION: NORMAL
TIME IN EXERCISE PHASE: NORMAL

## 2021-06-01 PROCEDURE — 78452 HT MUSCLE IMAGE SPECT MULT: CPT | Performed by: INTERNAL MEDICINE

## 2021-06-01 PROCEDURE — 78452 HT MUSCLE IMAGE SPECT MULT: CPT

## 2021-06-01 PROCEDURE — 93018 CV STRESS TEST I&R ONLY: CPT | Performed by: INTERNAL MEDICINE

## 2021-06-01 PROCEDURE — A9502 TC99M TETROFOSMIN: HCPCS

## 2021-06-01 PROCEDURE — 93017 CV STRESS TEST TRACING ONLY: CPT

## 2021-06-01 PROCEDURE — 93016 CV STRESS TEST SUPVJ ONLY: CPT | Performed by: INTERNAL MEDICINE

## 2021-06-01 PROCEDURE — G1004 CDSM NDSC: HCPCS

## 2021-06-02 ENCOUNTER — TELEPHONE (OUTPATIENT)
Dept: CARDIOLOGY CLINIC | Facility: CLINIC | Age: 66
End: 2021-06-02

## 2021-06-02 NOTE — TELEPHONE ENCOUNTER
----- Message from Mundo Mathur MD sent at 6/1/2021  4:39 PM EDT -----  Pt's Patient's stress test is normal    Patient can keep regular appointment  Please call patient with the result

## 2021-06-17 ENCOUNTER — HOSPITAL ENCOUNTER (OUTPATIENT)
Dept: NON INVASIVE DIAGNOSTICS | Facility: HOSPITAL | Age: 66
Discharge: HOME | End: 2021-06-17
Attending: INTERNAL MEDICINE
Payer: MEDICARE

## 2021-06-17 DIAGNOSIS — R01.1 CARDIAC MURMUR: ICD-10-CM

## 2021-06-17 DIAGNOSIS — I34.1 MITRAL VALVE PROLAPSE: ICD-10-CM

## 2021-06-17 DIAGNOSIS — E78.5 DYSLIPIDEMIA: ICD-10-CM

## 2021-06-17 DIAGNOSIS — F41.9 ANXIETY: ICD-10-CM

## 2021-06-17 DIAGNOSIS — I10 ESSENTIAL HYPERTENSION: ICD-10-CM

## 2021-06-17 PROCEDURE — 93306 TTE W/DOPPLER COMPLETE: CPT

## 2021-06-18 ENCOUNTER — TELEPHONE (OUTPATIENT)
Dept: CARDIOLOGY CLINIC | Facility: CLINIC | Age: 66
End: 2021-06-18

## 2021-06-18 PROCEDURE — 93306 TTE W/DOPPLER COMPLETE: CPT | Performed by: INTERNAL MEDICINE

## 2021-06-18 NOTE — TELEPHONE ENCOUNTER
----- Message from Luis Daniel Briceño MD sent at 6/18/2021  9:32 AM EDT -----  Patient's echo shows normal LV systolic function moderate MR  Will repeat study in 1 year he can keep his appointment

## 2021-10-21 ENCOUNTER — OFFICE VISIT (OUTPATIENT)
Dept: CARDIOLOGY CLINIC | Facility: CLINIC | Age: 66
End: 2021-10-21
Payer: MEDICARE

## 2021-10-21 VITALS
HEART RATE: 69 BPM | OXYGEN SATURATION: 94 % | TEMPERATURE: 98 F | SYSTOLIC BLOOD PRESSURE: 122 MMHG | HEIGHT: 64 IN | BODY MASS INDEX: 25.18 KG/M2 | WEIGHT: 147.5 LBS | DIASTOLIC BLOOD PRESSURE: 70 MMHG

## 2021-10-21 DIAGNOSIS — R01.1 CARDIAC MURMUR: ICD-10-CM

## 2021-10-21 DIAGNOSIS — I34.1 MITRAL VALVE PROLAPSE: ICD-10-CM

## 2021-10-21 DIAGNOSIS — F41.9 ANXIETY: ICD-10-CM

## 2021-10-21 DIAGNOSIS — E78.5 DYSLIPIDEMIA: ICD-10-CM

## 2021-10-21 DIAGNOSIS — I10 ESSENTIAL HYPERTENSION: ICD-10-CM

## 2021-10-21 PROCEDURE — 99214 OFFICE O/P EST MOD 30 MIN: CPT | Performed by: INTERNAL MEDICINE

## 2021-10-21 PROCEDURE — 93000 ELECTROCARDIOGRAM COMPLETE: CPT | Performed by: INTERNAL MEDICINE

## 2021-11-12 DIAGNOSIS — I10 ESSENTIAL HYPERTENSION: ICD-10-CM

## 2021-11-12 DIAGNOSIS — E78.5 DYSLIPIDEMIA: ICD-10-CM

## 2021-11-12 RX ORDER — TELMISARTAN AND HYDROCHLORTHIAZIDE 80; 12.5 MG/1; MG/1
1 TABLET ORAL DAILY
Qty: 90 TABLET | Refills: 3 | Status: SHIPPED | OUTPATIENT
Start: 2021-11-12

## 2021-11-12 RX ORDER — AMLODIPINE BESYLATE 10 MG/1
10 TABLET ORAL DAILY
Qty: 90 TABLET | Refills: 3 | Status: SHIPPED | OUTPATIENT
Start: 2021-11-12

## 2021-11-12 RX ORDER — ATORVASTATIN CALCIUM 10 MG/1
10 TABLET, FILM COATED ORAL DAILY
Qty: 90 TABLET | Refills: 3 | Status: SHIPPED | OUTPATIENT
Start: 2021-11-12

## 2022-07-16 LAB — HBA1C MFR BLD HPLC: 6 %

## 2022-07-28 ENCOUNTER — OFFICE VISIT (OUTPATIENT)
Dept: CARDIOLOGY CLINIC | Facility: CLINIC | Age: 67
End: 2022-07-28
Payer: MEDICARE

## 2022-07-28 VITALS
WEIGHT: 141 LBS | OXYGEN SATURATION: 98 % | BODY MASS INDEX: 24.07 KG/M2 | DIASTOLIC BLOOD PRESSURE: 70 MMHG | HEART RATE: 72 BPM | TEMPERATURE: 97 F | SYSTOLIC BLOOD PRESSURE: 120 MMHG | HEIGHT: 64 IN

## 2022-07-28 DIAGNOSIS — R94.39 ABNORMAL STRESS ECG WITH TREADMILL: ICD-10-CM

## 2022-07-28 DIAGNOSIS — R01.1 CARDIAC MURMUR: ICD-10-CM

## 2022-07-28 DIAGNOSIS — I34.0 MODERATE MITRAL REGURGITATION: ICD-10-CM

## 2022-07-28 DIAGNOSIS — E78.5 DYSLIPIDEMIA: ICD-10-CM

## 2022-07-28 DIAGNOSIS — I10 ESSENTIAL HYPERTENSION: ICD-10-CM

## 2022-07-28 DIAGNOSIS — F41.9 ANXIETY: ICD-10-CM

## 2022-07-28 PROCEDURE — 99214 OFFICE O/P EST MOD 30 MIN: CPT | Performed by: INTERNAL MEDICINE

## 2022-07-28 PROCEDURE — 93000 ELECTROCARDIOGRAM COMPLETE: CPT | Performed by: INTERNAL MEDICINE

## 2022-07-28 NOTE — PROGRESS NOTES
Progress Note - Cardiology Office  [facility]    Elena Giles 75 y o  male MRN: [id number]  : 1955  Encounter: [id number]      Assessment:     1  Essential hypertension    2  Dyslipidemia    3  Cardiac murmur    4  Anxiety    5  Moderate mitral regurgitation    6  Abnormal stress ECG with treadmill        Discussion Summary and Plan:  1  Hypertension  Patient has longstanding history of essential hypertension  Presently well controlled with amlodipine and Micardis HCT  Patient blood test reviewed from 2020 they are acceptable      2  Cardiac murmur  Previous echo Doppler shows at least moderate mitral regurgitation reviewed by me  Patient is known to have procedure vital lab prolapse  Repeat echo Doppler is read as moderate previously has been at moderate to severe at least he has moderate mitral regurgitation  Last echo was 2021  He has moderate mitral regurgitation will update echo Doppler    3  Dyslipidemia  Continue statins  He will continue same dose and try to changes diet if LDL persistently above 100 we may consider increasing the dose  4   Anxiety  Patient was reassured  Advised him to continue current medications  He is pretty active at his job he walked 12,000 steps today  5  Mitral valve prolapse with at least moderate mitral regurgitation  LV size was okay on last echo and repeat echo  Repeat echo Doppler ordered    6  Abnormal exercise stress test   But nuclear stress test shows no ischemia good exercise capacity  Patient is asymptomatic otherwise  Patient and his wife was advised and educated to call our office  immediately if  patient has any new symptoms of chest pain/shortness of breath, near-syncope, syncope, light headedness sustained palpitations  or any other cardiovascular symptoms before their scheduled follow-up appointment  Office #[phone]      Follow-up in 6 months    Counseling :  A description of the counseling  Goals and Barriers  Patient's ability to self care: Yes  Medication side effect reviewed with patient in detail and all their questions answered to their satisfaction  HPI :     Benita Rayo is a 75y o  year old male who came for follow up  Patient has history of hypertension hyperlipidemia benign prostate hypertrophy who was initially seen by me few years ago for hypertension  His cholesterol was previously diet controlled and has been doing well but over the year due to his age now his risk is higher and he is willing to go on statin therapy  He denies any chest pain or any shortness of breath  No fever no chills no PND no orthopnea no other significant complaint  09/23/2020  Above reviewed  Patient came follow-up he is doing well his blood pressure is pretty well controlled  He had a blood test done on 09/15/2020  Most of the labs were acceptable cholesterol slightly high with   LFTs were acceptable he was taking Lipitor 5 mg  He brought his diary of blood pressure most of the blood pressure readings are acceptable blood pressure here is also acceptable no dizziness no lightheadedness  EKG shows heart rate 62 beats per minute no significant ST changes  No nausea no vomiting no fever no PND no orthopnea no other issues  He is trying to walk 15 miles a week  Patient is known to have mitral regurgitation with moderate to severe in great secondary to mitral valve prolapse of posterior leaflet        10/21/2021  Above reviewed  Patient came for follow-up  His blood pressure is well controlled  He has medical history significant for at least moderate MR, history of posterior mitral valve prolapse, dyslipidemia who came for follow-up  He had a cardiac workup done in June 2021 which shows normal LV systolic function moderate MR and nuclear stress test shows no ischemia  Since echo report reviewed with him he has no symptoms he is otherwise doing well    Issues related to certain valve rupture also discussed with him  His wife was present with him     07/28/2022  Above reviewed  Patient came for follow-up he is doing well  He has blood test done with his primary care doctor who has copy brought and very reviewed  His cholesterol is 188 HDL is 62   Previously his LDL used to be 78  He has history of moderate MR with posterior mitral leaflet prolapse, dyslipidemia, hypertension and he has been doing well  His cardiac workup from June 2021 reviewed  No other significant issues he is trying to exercise every day and trying to eat healthy weight  No other symptoms at this time        Review of Systems   Constitutional: Negative for activity change, chills, diaphoresis, fever and unexpected weight change  HENT: Negative for congestion  Eyes: Negative for discharge and redness  Respiratory: Negative for cough, chest tightness, shortness of breath and wheezing  Cardiovascular: Negative  Negative for chest pain, palpitations and leg swelling  Gastrointestinal: Negative for abdominal pain, diarrhea and nausea  Endocrine: Negative  Genitourinary: Negative for decreased urine volume and urgency  Musculoskeletal: Negative  Negative for arthralgias, back pain and gait problem  Skin: Negative for rash and wound  Allergic/Immunologic: Negative  Neurological: Negative for dizziness, seizures, syncope, weakness, light-headedness and headaches  Hematological: Negative  Psychiatric/Behavioral: Negative for agitation and confusion  The patient is not nervous/anxious  Historical Information   Past Medical History:   Diagnosis Date    Hyperlipidemia     Hypertension      No past surgical history on file    Social History     Substance and Sexual Activity   Alcohol Use Yes    Comment: occ     Social History     Substance and Sexual Activity   Drug Use No     Social History     Tobacco Use   Smoking Status Never Smoker   Smokeless Tobacco Never Used Family History:   Family History   Problem Relation Age of Onset    Kidney failure Father     Hypertension Brother     No Known Problems Mother     No Known Problems Sister     No Known Problems Maternal Aunt     No Known Problems Maternal Uncle     No Known Problems Paternal Aunt     No Known Problems Paternal Uncle     No Known Problems Maternal Grandmother     No Known Problems Maternal Grandfather     No Known Problems Paternal Grandmother     No Known Problems Paternal Grandfather        Meds/Allergies     No Known Allergies    Current Outpatient Medications:     amLODIPine (NORVASC) 10 mg tablet, Take 1 tablet (10 mg total) by mouth daily, Disp: 90 tablet, Rfl: 3    atorvastatin (LIPITOR) 10 mg tablet, Take 1 tablet (10 mg total) by mouth daily, Disp: 90 tablet, Rfl: 3    glucosamine 500 MG CAPS capsule, Take 500 mg by mouth, Disp: , Rfl:     Silodosin 8 MG CAPS, Take by mouth, Disp: , Rfl:     telmisartan-hydrochlorothiazide (MICARDIS HCT) 80-12 5 MG per tablet, Take 1 tablet by mouth daily, Disp: 90 tablet, Rfl: 3    Vitals: Blood pressure 120/70, pulse 72, temperature (!) 97 °F (36 1 °C), height 5' 4" (1 626 m), weight 64 kg (141 lb), SpO2 98 %  ?  Body mass index is 24 2 kg/m²  Vitals:    07/28/22 1455   Weight: 64 kg (141 lb)     BP Readings from Last 3 Encounters:   07/28/22 120/70   10/21/21 122/70   04/20/21 120/76         Physical Exam  Constitutional:       General: He is not in acute distress  Appearance: He is well-developed  He is not diaphoretic  Neck:      Thyroid: No thyromegaly  Vascular: No JVD  Cardiovascular:      Rate and Rhythm: Normal rate  Heart sounds: Murmur heard  Comments: Patient has harsh 3/6 pansystolic murmur in the axilla area  Known to have moderate mitral regurgitation  Pulmonary:      Effort: Pulmonary effort is normal  No respiratory distress  Breath sounds: Normal breath sounds  No wheezing or rales     Abdominal: General: There is no distension  Palpations: Abdomen is soft  Tenderness: There is no abdominal tenderness  There is no rebound  Musculoskeletal:         General: No tenderness  Normal range of motion  Cervical back: Normal range of motion and neck supple  Comments: No edema   Skin:     General: Skin is warm and dry  Neurological:      Mental Status: He is alert and oriented to person, place, and time  Psychiatric:         Behavior: Behavior normal          Judgment: Judgment normal           Diagnostic Studies Review Cardio:  Lab Review: Patient has blood test done outside  Cholesterol was 147 triglyceride 52 HDL 60 LDL 76  LFTs are acceptable  Hemoglobin 14 4 hematocrit 45  HbA1c 6 0  LFTs were acceptable  Potassium 3 8 sodium 141  Stress Test: Nuclear stress test in December 2015 shows no ischemia EF was 60%  Nuclear stress test   Nuclear stress test done in June 2021 shows no ischemia normal LV systolic function  Echocardiogram/TAMIKA: Echo Doppler in 1556 shows a systolic function, mild MR, and a repeat echo in January 2017 shows normal systolic function with no significant change  Mild MR was noted  repeat echo Doppler done in June 2021 shows EF 60 65% mild LVH, moderate prolapse of posterior leaflet with moderate MR, mild TR PA pressure 35 mmHg  ECG Report: Twelve-lead EKG done 4/3/2017 shows normal sinus done no significant   ST changes no change from old EKG     Repeat EKG shows normal sinus rhythm heart rate 58 beats per minute  No significant ST changes  High voltage may be due to thin chest     Twelve lead EKG done in our office 01/21/2019 shows normal sinus rhythm LVH by voltage  No other significant ST changes  Twelve lead EKG 10/11/2019 shows normal sinus rhythm heart rate 81 beats per minute  Twelve lead EKG done 09/23/2020 shows normal sinus moderate voltage for LVH no change from old EKG      Twelve lead EKG done on 04/20/2021 shows sinus rhythm heart rate 63 beats per minute LVH by voltage nose change from previous EKG  12 lead EKG done 10/21/2021 shows normal sinus rhythm heart rate 69 beats per minute LVH by voltage no change from previous EKG    Twelve lead EKG 07/28/2022 shows normal sinus rhythm heart rate 72 beats per minute no significant change from previous EKG  His blood work done with his primary care doctor on 09/15/2020 which was reviewed acceptable  HbA1c was 5 9, TSH 1 2,  cholesterol 195 LFTs and creatinine was acceptable  Creatinine was 1 02  Patient had blood test done at private facility which included BMP, CBC, TSH C-reactive protein vitamin-D there were all acceptable TSH was 1 6 and his creatinine was 0 93  No cholesterol test was done    Patient has blood test done with his primary care doctor July 2022 reviewed cholesterol was 188 with triglyceride 66  HbA1c 6 0 and electrolytes were acceptable  Dr Rosie Arellano MD [facility] [place]      "This note has been constructed using a voice recognition system  Therefore there may be syntax, spelling, and/or grammatical errors   Please call if you have any questions  "

## 2022-08-24 ENCOUNTER — HOSPITAL ENCOUNTER (OUTPATIENT)
Dept: NON INVASIVE DIAGNOSTICS | Facility: HOSPITAL | Age: 67
Discharge: HOME | End: 2022-08-24
Attending: INTERNAL MEDICINE
Payer: MEDICARE

## 2022-08-24 ENCOUNTER — TELEPHONE (OUTPATIENT)
Dept: CARDIOLOGY CLINIC | Facility: CLINIC | Age: 67
End: 2022-08-24

## 2022-08-24 VITALS
SYSTOLIC BLOOD PRESSURE: 122 MMHG | HEIGHT: 64 IN | WEIGHT: 141 LBS | HEART RATE: 56 BPM | BODY MASS INDEX: 24.07 KG/M2 | DIASTOLIC BLOOD PRESSURE: 70 MMHG

## 2022-08-24 DIAGNOSIS — I34.0 MODERATE MITRAL REGURGITATION: ICD-10-CM

## 2022-08-24 DIAGNOSIS — F41.9 ANXIETY: ICD-10-CM

## 2022-08-24 DIAGNOSIS — R94.39 ABNORMAL STRESS ECG WITH TREADMILL: ICD-10-CM

## 2022-08-24 DIAGNOSIS — I10 ESSENTIAL HYPERTENSION: ICD-10-CM

## 2022-08-24 DIAGNOSIS — E78.5 DYSLIPIDEMIA: ICD-10-CM

## 2022-08-24 DIAGNOSIS — R01.1 CARDIAC MURMUR: ICD-10-CM

## 2022-08-24 LAB
AORTIC ROOT: 2.7 CM
AORTIC VALVE MEAN VELOCITY: 10.6 M/S
APICAL FOUR CHAMBER EJECTION FRACTION: 70 %
AV AREA BY CONTINUOUS VTI: 2 CM2
AV AREA PEAK VELOCITY: 2 CM2
AV LVOT MEAN GRADIENT: 2 MMHG
AV LVOT PEAK GRADIENT: 5 MMHG
AV MEAN GRADIENT: 5 MMHG
AV PEAK GRADIENT: 10 MMHG
AV VALVE AREA: 1.96 CM2
AV VELOCITY RATIO: 0.69
DOP CALC AO PEAK VEL: 1.57 M/S
DOP CALC AO VTI: 29.94 CM
DOP CALC LVOT AREA: 2.83 CM2
DOP CALC LVOT DIAMETER: 1.9 CM
DOP CALC LVOT PEAK VEL VTI: 20.68 CM
DOP CALC LVOT PEAK VEL: 1.08 M/S
DOP CALC LVOT STROKE INDEX: 35.5 ML/M2
DOP CALC LVOT STROKE VOLUME: 60
DOP CALC MV VTI: 48.44 CM
E WAVE DECELERATION TIME: 230 MS
E/A RATIO: 1.81
FRACTIONAL SHORTENING: 39 (ref 28–44)
INTERVENTRICULAR SEPTUM IN DIASTOLE (PARASTERNAL SHORT AXIS VIEW): 0.9 CM
INTERVENTRICULAR SEPTUM: 0.9 CM (ref 0.6–1.1)
LAAS-AP2: 25.1 CM2
LAAS-AP4: 32.2 CM2
LEFT ATRIUM AREA SYSTOLE SINGLE PLANE A4C: 32.9 CM2
LEFT ATRIUM SIZE: 4.6 CM
LEFT ATRIUM VOLUME INDEX (MOD BIPLANE): 27.2
LEFT INTERNAL DIMENSION IN SYSTOLE: 3.4 CM (ref 2.1–4)
LEFT VENTRICULAR INTERNAL DIMENSION IN DIASTOLE: 5.6 CM (ref 3.5–6)
LEFT VENTRICULAR POSTERIOR WALL IN END DIASTOLE: 0.9 CM
LEFT VENTRICULAR STROKE VOLUME: 104 ML
LVSV (TEICH): 104 ML
MITRAL REGURGITATION PEAK VELOCITY: 3.87 M/S
MITRAL VALVE MEAN INFLOW VELOCITY: 3.56 M/S
MITRAL VALVE REGURGITANT PEAK GRADIENT: 60 MMHG
MV E'TISSUE VEL-LAT: 13 CM/S
MV E'TISSUE VEL-SEP: 9 CM/S
MV MEAN GRADIENT: 2 MMHG
MV PEAK A VEL: 0.73 M/S
MV PEAK E VEL: 132 CM/S
MV PEAK GRADIENT: 9 MMHG
MV STENOSIS PRESSURE HALF TIME: 67 MS
MV VALVE AREA BY CONTINUITY EQUATION: 1.21 CM2
MV VALVE AREA P 1/2 METHOD: 3.3
RIGHT ATRIUM AREA SYSTOLE A4C: 16.9 CM2
RIGHT VENTRICLE ID DIMENSION: 3.9 CM
SL CV DOP CALC MV VTI RETROGRADE: 134.7 CM
SL CV LEFT ATRIUM LENGTH A2C: 5.9 CM
SL CV MV MEAN GRADIENT RETROGRADE: 52 MMHG
SL CV PED ECHO LEFT VENTRICLE DIASTOLIC VOLUME (MOD BIPLANE) 2D: 152 ML
SL CV PED ECHO LEFT VENTRICLE SYSTOLIC VOLUME (MOD BIPLANE) 2D: 48 ML
TR MAX PG: 22 MMHG
TR PEAK VELOCITY: 2.4 M/S
TRICUSPID VALVE PEAK REGURGITATION VELOCITY: 2.36 M/S

## 2022-08-24 PROCEDURE — 93306 TTE W/DOPPLER COMPLETE: CPT

## 2022-08-24 PROCEDURE — 93306 TTE W/DOPPLER COMPLETE: CPT | Performed by: INTERNAL MEDICINE

## 2022-08-24 RX ORDER — ATORVASTATIN CALCIUM 10 MG/1
10 TABLET, FILM COATED ORAL DAILY
Qty: 90 TABLET | Refills: 3 | Status: SHIPPED | OUTPATIENT
Start: 2022-08-24

## 2022-08-24 RX ORDER — AMLODIPINE BESYLATE 10 MG/1
10 TABLET ORAL DAILY
Qty: 90 TABLET | Refills: 3 | Status: SHIPPED | OUTPATIENT
Start: 2022-08-24

## 2022-08-24 RX ORDER — TELMISARTAN AND HYDROCHLORTHIAZIDE 80; 12.5 MG/1; MG/1
1 TABLET ORAL DAILY
Qty: 90 TABLET | Refills: 3 | Status: SHIPPED | OUTPATIENT
Start: 2022-08-24

## 2022-08-24 NOTE — TELEPHONE ENCOUNTER
----- Message from Isabel Shepherd MD sent at 8/24/2022  1:18 PM EDT -----  Patient's echo Doppler shows patient has moderate mitral regurgitation  E and a ratio may be affected by that  Right ventricular systolic pressure is normal   Continue same medications  EF is stable

## 2022-11-22 DIAGNOSIS — I10 ESSENTIAL HYPERTENSION: ICD-10-CM

## 2022-11-22 RX ORDER — TELMISARTAN AND HYDROCHLORTHIAZIDE 80; 12.5 MG/1; MG/1
1 TABLET ORAL DAILY
Qty: 90 TABLET | Refills: 3 | Status: SHIPPED | OUTPATIENT
Start: 2022-11-22

## 2022-12-21 ENCOUNTER — TELEPHONE (OUTPATIENT)
Dept: GASTROENTEROLOGY | Facility: CLINIC | Age: 67
End: 2022-12-21

## 2023-01-03 ENCOUNTER — PREP FOR PROCEDURE (OUTPATIENT)
Dept: GASTROENTEROLOGY | Facility: CLINIC | Age: 68
End: 2023-01-03

## 2023-01-03 DIAGNOSIS — Z86.010 HISTORY OF COLON POLYPS: Primary | ICD-10-CM

## 2023-02-23 ENCOUNTER — HOSPITAL ENCOUNTER (OUTPATIENT)
Dept: GASTROENTEROLOGY | Facility: AMBULARY SURGERY CENTER | Age: 68
Setting detail: OUTPATIENT SURGERY
Discharge: HOME | End: 2023-02-23
Attending: INTERNAL MEDICINE

## 2023-02-23 ENCOUNTER — ANESTHESIA EVENT (OUTPATIENT)
Dept: GASTROENTEROLOGY | Facility: AMBULARY SURGERY CENTER | Age: 68
End: 2023-02-23

## 2023-02-23 ENCOUNTER — ANESTHESIA (OUTPATIENT)
Dept: GASTROENTEROLOGY | Facility: AMBULARY SURGERY CENTER | Age: 68
End: 2023-02-23

## 2023-02-23 VITALS
HEART RATE: 85 BPM | DIASTOLIC BLOOD PRESSURE: 79 MMHG | SYSTOLIC BLOOD PRESSURE: 129 MMHG | TEMPERATURE: 98.8 F | RESPIRATION RATE: 18 BRPM | OXYGEN SATURATION: 100 %

## 2023-02-23 DIAGNOSIS — Z86.010 HISTORY OF COLON POLYPS: ICD-10-CM

## 2023-02-23 RX ORDER — SODIUM CHLORIDE, SODIUM LACTATE, POTASSIUM CHLORIDE, CALCIUM CHLORIDE 600; 310; 30; 20 MG/100ML; MG/100ML; MG/100ML; MG/100ML
125 INJECTION, SOLUTION INTRAVENOUS CONTINUOUS
Status: DISCONTINUED | OUTPATIENT
Start: 2023-02-23 | End: 2023-02-27 | Stop reason: HOSPADM

## 2023-02-23 RX ORDER — SODIUM CHLORIDE, SODIUM LACTATE, POTASSIUM CHLORIDE, CALCIUM CHLORIDE 600; 310; 30; 20 MG/100ML; MG/100ML; MG/100ML; MG/100ML
INJECTION, SOLUTION INTRAVENOUS CONTINUOUS PRN
Status: DISCONTINUED | OUTPATIENT
Start: 2023-02-23 | End: 2023-02-23

## 2023-02-23 RX ORDER — PROPOFOL 10 MG/ML
INJECTION, EMULSION INTRAVENOUS CONTINUOUS PRN
Status: DISCONTINUED | OUTPATIENT
Start: 2023-02-23 | End: 2023-02-23

## 2023-02-23 RX ORDER — LIDOCAINE HYDROCHLORIDE 10 MG/ML
INJECTION, SOLUTION EPIDURAL; INFILTRATION; INTRACAUDAL; PERINEURAL AS NEEDED
Status: DISCONTINUED | OUTPATIENT
Start: 2023-02-23 | End: 2023-02-23

## 2023-02-23 RX ORDER — GLYCOPYRROLATE 0.2 MG/ML
INJECTION INTRAMUSCULAR; INTRAVENOUS AS NEEDED
Status: DISCONTINUED | OUTPATIENT
Start: 2023-02-23 | End: 2023-02-23

## 2023-02-23 RX ORDER — PROPOFOL 10 MG/ML
INJECTION, EMULSION INTRAVENOUS AS NEEDED
Status: DISCONTINUED | OUTPATIENT
Start: 2023-02-23 | End: 2023-02-23

## 2023-02-23 RX ADMIN — SODIUM CHLORIDE, SODIUM LACTATE, POTASSIUM CHLORIDE, AND CALCIUM CHLORIDE 125 ML/HR: .6; .31; .03; .02 INJECTION, SOLUTION INTRAVENOUS at 07:56

## 2023-02-23 RX ADMIN — PROPOFOL 20 MG: 10 INJECTION, EMULSION INTRAVENOUS at 08:55

## 2023-02-23 RX ADMIN — GLYCOPYRROLATE 0.2 MCG: 0.2 INJECTION INTRAMUSCULAR; INTRAVENOUS at 08:57

## 2023-02-23 RX ADMIN — SODIUM CHLORIDE, SODIUM LACTATE, POTASSIUM CHLORIDE, AND CALCIUM CHLORIDE: .6; .31; .03; .02 INJECTION, SOLUTION INTRAVENOUS at 08:45

## 2023-02-23 RX ADMIN — PROPOFOL 100 MCG/KG/MIN: 10 INJECTION, EMULSION INTRAVENOUS at 08:51

## 2023-02-23 RX ADMIN — LIDOCAINE HYDROCHLORIDE 50 MG: 10 INJECTION, SOLUTION EPIDURAL; INFILTRATION; INTRACAUDAL at 08:50

## 2023-02-23 RX ADMIN — PROPOFOL 100 MG: 10 INJECTION, EMULSION INTRAVENOUS at 08:50

## 2023-02-23 NOTE — ANESTHESIA POSTPROCEDURE EVALUATION
Post-Op Assessment Note    CV Status:  Stable  Pain Score: 0    Pain management: adequate     Mental Status:  Arousable   Hydration Status:  Euvolemic   PONV Controlled:  Controlled   Airway Patency:  Patent      Post Op Vitals Reviewed: Yes      Staff: CRNA         No notable events documented      BP 94/52   Temp      Pulse 82   Resp 14   SpO2 97

## 2023-02-23 NOTE — H&P
History and Physical -  [facility]  Molly Burr 75 y o  male MRN: [id number]                  HPI: Molly Burr is a 75y o  year old male who presents for surveillance of prior adenoma      REVIEW OF SYSTEMS: Per the HPI, and otherwise unremarkable      Historical Information   Past Medical History:   Diagnosis Date   • Colon polyp    • Hyperlipidemia    • Hypertension      Past Surgical History:   Procedure Laterality Date   • COLONOSCOPY     • NO PAST SURGERIES       Social History   Social History     Substance and Sexual Activity   Alcohol Use Yes    Comment: occ     Social History     Substance and Sexual Activity   Drug Use No     Social History     Tobacco Use   Smoking Status Never   Smokeless Tobacco Never     Family History   Problem Relation Age of Onset   • Kidney failure Father    • Hypertension Brother    • No Known Problems Mother    • No Known Problems Sister    • No Known Problems Maternal Aunt    • No Known Problems Maternal Uncle    • No Known Problems Paternal Aunt    • No Known Problems Paternal Uncle    • No Known Problems Maternal Grandmother    • No Known Problems Maternal Grandfather    • No Known Problems Paternal Grandmother    • No Known Problems Paternal Grandfather        Meds/Allergies       Current Outpatient Medications:   •  amLODIPine (NORVASC) 10 mg tablet  •  atorvastatin (LIPITOR) 10 mg tablet  •  Calcium Ascorbate (VITAMIN C) 500 mg tablet  •  glucosamine 500 MG CAPS capsule  •  Silodosin 8 MG CAPS  •  telmisartan-hydrochlorothiazide (MICARDIS HCT) 80-12 5 MG per tablet  •  zinc gluconate 50 mg tablet    Current Facility-Administered Medications:   •  lactated ringers infusion, 125 mL/hr, Intravenous, Continuous, 125 mL/hr at 02/23/23 0756    No Known Allergies    Objective     /76   Pulse 72   Temp 98 8 °F (37 1 °C) (Temporal)   Resp 18   SpO2 100%       PHYSICAL EXAM    Gen: NAD  Head: NCAT  CV: RRR  CHEST: Clear  ABD: soft, NT/ND  EXT: no edema      ASSESSMENT/PLAN:  This is a 75y o  year old male here for colonoscopy, and he is stable and optimized for his procedure

## 2023-08-03 ENCOUNTER — OFFICE VISIT (OUTPATIENT)
Dept: CARDIOLOGY CLINIC | Facility: CLINIC | Age: 68
End: 2023-08-03
Payer: MEDICARE

## 2023-08-03 VITALS
DIASTOLIC BLOOD PRESSURE: 70 MMHG | SYSTOLIC BLOOD PRESSURE: 120 MMHG | WEIGHT: 141 LBS | OXYGEN SATURATION: 96 % | BODY MASS INDEX: 24.07 KG/M2 | HEIGHT: 64 IN | HEART RATE: 68 BPM

## 2023-08-03 DIAGNOSIS — F41.9 ANXIETY: ICD-10-CM

## 2023-08-03 DIAGNOSIS — E78.5 DYSLIPIDEMIA: ICD-10-CM

## 2023-08-03 DIAGNOSIS — I34.0 MODERATE MITRAL REGURGITATION: ICD-10-CM

## 2023-08-03 DIAGNOSIS — R94.39 ABNORMAL STRESS ECG WITH TREADMILL: ICD-10-CM

## 2023-08-03 DIAGNOSIS — I10 ESSENTIAL HYPERTENSION: ICD-10-CM

## 2023-08-03 DIAGNOSIS — R01.1 CARDIAC MURMUR: ICD-10-CM

## 2023-08-03 PROCEDURE — 93000 ELECTROCARDIOGRAM COMPLETE: CPT | Performed by: INTERNAL MEDICINE

## 2023-08-03 PROCEDURE — 99214 OFFICE O/P EST MOD 30 MIN: CPT | Performed by: INTERNAL MEDICINE

## 2023-08-03 NOTE — PROGRESS NOTES
Progress Note - Cardiology Office  [facility]    Charissa Holden 70 y.o. male MRN: [id number]  : 1955  Encounter: [id number]      Assessment:     1. Essential hypertension    2. Cardiac murmur    3. Moderate mitral regurgitation    4. Dyslipidemia    5. Anxiety    6. Abnormal stress ECG with treadmill        Discussion Summary and Plan:  1. Hypertension. Patient has longstanding history of essential hypertension. Presently well controlled with amlodipine and Micardis HCT. Test done last year was acceptable except for high cholesterol. He will work on it      2. Cardiac murmur. Previous echo Doppler shows at least moderate mitral regurgitation reviewed by me. Patient is known to have procedure vital lab prolapse. Repeat echo Doppler is read as moderate previously has been at moderate to severe at least he has moderate mitral regurgitation. Repeat echo Doppler. Doppler    3. Dyslipidemia. Continue statins. He will continue same dose and try to changes diet if LDL persistently above 100 we may consider increasing the dose. Scheduled to have repeat blood test.    4.  Anxiety. Patient was reassured. Advised him to continue current medications. He is pretty active at his job he walked 12,000 steps today. 5. Mitral valve prolapse with at least moderate mitral regurgitation. LV size was okay on last echo and repeat echo. Repeat echo Doppler will be ordered    6. Abnormal exercise stress test.  But nuclear stress test shows no ischemia good exercise capacity. Patient is asymptomatic otherwise. [place] with patient. Patient and his wife was advised and educated to call our office  immediately if  patient has any new symptoms of chest pain/shortness of breath, near-syncope, syncope, light headedness sustained palpitations  or any other cardiovascular symptoms before their scheduled follow-up appointment. Office #[phone].     Follow-up in 6 months    Counseling :  HUMBERTO description of the counseling. Goals and Barriers. Patient's ability to self care: Yes  Medication side effect reviewed with patient in detail and all their questions answered to their satisfaction. HPI :     Kamryn William is a 70y.o. year old male who came for follow up. Patient has history of hypertension hyperlipidemia benign prostate hypertrophy who was initially seen by me few years ago for hypertension. His cholesterol was previously diet controlled and has been doing well but over the year due to his age now his risk is higher and he is willing to go on statin therapy. He denies any chest pain or any shortness of breath. No fever no chills no PND no orthopnea no other significant complaint. 09/23/2020. Above reviewed. Patient came follow-up he is doing well his blood pressure is pretty well controlled. He had a blood test done on 09/15/2020. Most of the labs were acceptable cholesterol slightly high with . LFTs were acceptable he was taking Lipitor 5 mg. He brought his diary of blood pressure most of the blood pressure readings are acceptable blood pressure here is also acceptable no dizziness no lightheadedness. EKG shows heart rate 62 beats per minute no significant ST changes. No nausea no vomiting no fever no PND no orthopnea no other issues. He is trying to walk 15 miles a week. Patient is known to have mitral regurgitation with moderate to severe in great secondary to mitral valve prolapse of posterior leaflet        10/21/2021. Above reviewed. Patient came for follow-up. His blood pressure is well controlled. He has medical history significant for at least moderate MR, history of posterior mitral valve prolapse, dyslipidemia who came for follow-up. He had a cardiac workup done in June 2021 which shows normal LV systolic function moderate MR and nuclear stress test shows no ischemia. Since echo report reviewed with him he has no symptoms he is otherwise doing well. Issues related to certain valve rupture also discussed with him. His wife was present with him.    07/28/2022. Above reviewed. Patient came for follow-up he is doing well. He has blood test done with his primary care doctor who has copy brought and very reviewed. His cholesterol is 188 HDL is 62 . Previously his LDL used to be 78. He has history of moderate MR with posterior mitral leaflet prolapse, dyslipidemia, hypertension and he has been doing well. His cardiac workup from June 2021 reviewed. No other significant issues he is trying to exercise every day and trying to eat healthy weight. No other symptoms at this time. 8/3/2023. Above reviewed. Patient came for follow-up he is doing well. He has medical history significant for during procedure mitral prolapse with moderate MR, hypertension, dyslipidemia who came for follow-up. He denies any chest pain any shortness of breath. He is encouraged not to lift more than 30 pounds. He is otherwise very active EKG shows sinus rhythm with LVH. His medication reviewed he is on Micardis HCT, amlodipine 10 mg and Lipitor 10 mg.         Review of Systems       Historical Information   Past Medical History:   Diagnosis Date   • Colon polyp    • Hyperlipidemia    • Hypertension      Past Surgical History:   Procedure Laterality Date   • COLONOSCOPY     • NO PAST SURGERIES       Social History     Substance and Sexual Activity   Alcohol Use Yes    Comment: occ     Social History     Substance and Sexual Activity   Drug Use No     Social History     Tobacco Use   Smoking Status Never   Smokeless Tobacco Never     Family History:   Family History   Problem Relation Age of Onset   • Kidney failure Father    • Hypertension Brother    • No Known Problems Mother    • No Known Problems Sister    • No Known Problems Maternal Aunt    • No Known Problems Maternal Uncle    • No Known Problems Paternal Aunt    • No Known Problems Paternal Uncle    • No Known Problems Maternal Grandmother    • No Known Problems Maternal Grandfather    • No Known Problems Paternal Grandmother    • No Known Problems Paternal Grandfather        Meds/Allergies     No Known Allergies    Current Outpatient Medications:   •  amLODIPine (NORVASC) 10 mg tablet, Take 1 tablet (10 mg total) by mouth daily (Patient taking differently: Take 10 mg by mouth every evening), Disp: 90 tablet, Rfl: 3  •  atorvastatin (LIPITOR) 10 mg tablet, Take 1 tablet (10 mg total) by mouth daily, Disp: 90 tablet, Rfl: 3  •  Silodosin 8 MG CAPS, Take by mouth, Disp: , Rfl:   •  telmisartan-hydrochlorothiazide (MICARDIS HCT) 80-12.5 MG per tablet, Take 1 tablet by mouth daily, Disp: 90 tablet, Rfl: 3  •  zinc gluconate 50 mg tablet, Take 50 mg by mouth daily, Disp: , Rfl:   •  Calcium Ascorbate (VITAMIN C) 500 mg tablet, Take 500 mg by mouth daily (Patient not taking: Reported on 8/3/2023), Disp: , Rfl:     Vitals: Blood pressure 120/70, pulse 68, height 5' 4" (1.626 m), weight 64 kg (141 lb), SpO2 96 %. ?  Body mass index is 24.2 kg/m². Vitals:    08/03/23 1556   Weight: 64 kg (141 lb)     BP Readings from Last 3 Encounters:   08/03/23 120/70   02/23/23 129/79   08/24/22 122/70         Physical Exam  Constitutional:       General: He is not in acute distress. Appearance: He is well-developed. He is not diaphoretic. Neck:      Thyroid: No thyromegaly. Vascular: No JVD. Trachea: No tracheal deviation. Cardiovascular:      Rate and Rhythm: Normal rate and regular rhythm. Heart sounds: S1 normal and S2 normal. Heart sounds not distant. Murmur heard. Systolic (ejection) murmur is present with a grade of 2/6. No friction rub. No gallop. No S3 or S4 sounds. Comments: Patient has harsh 3/6 pansystolic murmur in the axilla area. Known to have moderate mitral regurgitation  Pulmonary:      Effort: Pulmonary effort is normal. No respiratory distress.       Breath sounds: Normal breath sounds. No wheezing or rales. Chest:      Chest wall: No tenderness. Abdominal:      General: Bowel sounds are normal. There is no distension. Palpations: Abdomen is soft. Tenderness: There is no abdominal tenderness. Musculoskeletal:         General: No deformity. Cervical back: Neck supple. Comments: No lower extremity edema   Skin:     General: Skin is warm and dry. Coloration: Skin is not pale. Findings: No rash. Neurological:      Mental Status: He is alert and oriented to person, place, and time. Psychiatric:         Behavior: Behavior normal.         Judgment: Judgment normal.          Diagnostic Studies Review Cardio:  Lab Review: Patient has blood test done outside. Cholesterol was 147 triglyceride 52 HDL 60 LDL 76. LFTs are acceptable. Hemoglobin 14.4 hematocrit 45. HbA1c 6.0. LFTs were acceptable. Potassium 3.8 sodium 141. Stress Test: Nuclear stress test in December 2015 shows no ischemia EF was 60%. Nuclear stress test.  Nuclear stress test done in June 2021 shows no ischemia normal LV systolic function. Echocardiogram/TAMIKA: Echo Doppler in 7784 shows a systolic function, mild MR, and a repeat echo in January 2017 shows normal systolic function with no significant change. Mild MR was noted. repeat echo Doppler done in June 2021 shows EF 60 65% mild LVH, moderate prolapse of posterior leaflet with moderate MR, mild TR PA pressure 35 mmHg. ECG Report: Twelve-lead EKG done 4/3/2017 shows normal sinus done no significant   ST changes no change from old EKG     Repeat EKG shows normal sinus rhythm heart rate 58 beats per minute. No significant ST changes. High voltage may be due to thin chest.    Twelve lead EKG done in our office 01/21/2019 shows normal sinus rhythm LVH by voltage. No other significant ST changes. Twelve lead EKG 10/11/2019 shows normal sinus rhythm heart rate 81 beats per minute.       Twelve lead EKG done 09/23/2020 shows normal sinus moderate voltage for LVH no change from old EKG. Twelve lead EKG done on 04/20/2021 shows sinus rhythm heart rate 63 beats per minute LVH by voltage nose change from previous EKG. 12 lead EKG done 10/21/2021 shows normal sinus rhythm heart rate 69 beats per minute LVH by voltage no change from previous EKG    Twelve lead EKG 07/28/2022 shows normal sinus rhythm heart rate 72 beats per minute no significant change from previous EKG. Twelve-lead EKG done on 8/3/2023 shows normal sinus rhythm heart rate 68 bpm LVH by voltage. As compared to previous EKG no change. His blood work done with his primary care doctor on 09/15/2020 which was reviewed acceptable. HbA1c was 5.9, TSH 1.2,  cholesterol 195 LFTs and creatinine was acceptable. Creatinine was 1.02. Patient had blood test done at private facility which included BMP, CBC, TSH C-reactive protein vitamin-D there were all acceptable TSH was 1.6 and his creatinine was 0.93. No cholesterol test was done    Patient has blood test done with his primary care doctor July 2022 reviewed cholesterol was 188 with triglyceride 66  HbA1c 6.0 and electrolytes were acceptable. Dr. Mendel Alpers, MD [facility]      "This note has been constructed using a voice recognition system. Therefore there may be syntax, spelling, and/or grammatical errors.  Please call if you have any questions. "

## 2023-08-05 LAB — HBA1C MFR BLD HPLC: 6 %

## 2023-08-24 ENCOUNTER — HOSPITAL ENCOUNTER (OUTPATIENT)
Dept: NON INVASIVE DIAGNOSTICS | Facility: HOSPITAL | Age: 68
Discharge: HOME | End: 2023-08-24
Attending: INTERNAL MEDICINE
Payer: MEDICARE

## 2023-08-24 VITALS
WEIGHT: 141 LBS | HEIGHT: 64 IN | BODY MASS INDEX: 24.07 KG/M2 | SYSTOLIC BLOOD PRESSURE: 120 MMHG | DIASTOLIC BLOOD PRESSURE: 70 MMHG | HEART RATE: 68 BPM

## 2023-08-24 DIAGNOSIS — F41.9 ANXIETY: ICD-10-CM

## 2023-08-24 DIAGNOSIS — I10 ESSENTIAL HYPERTENSION: ICD-10-CM

## 2023-08-24 DIAGNOSIS — R01.1 CARDIAC MURMUR: ICD-10-CM

## 2023-08-24 DIAGNOSIS — R94.39 ABNORMAL STRESS ECG WITH TREADMILL: ICD-10-CM

## 2023-08-24 DIAGNOSIS — E78.5 DYSLIPIDEMIA: ICD-10-CM

## 2023-08-24 DIAGNOSIS — I34.0 MODERATE MITRAL REGURGITATION: ICD-10-CM

## 2023-08-24 PROCEDURE — 93306 TTE W/DOPPLER COMPLETE: CPT

## 2023-08-25 ENCOUNTER — TELEPHONE (OUTPATIENT)
Dept: CARDIOLOGY CLINIC | Facility: CLINIC | Age: 68
End: 2023-08-25

## 2023-08-25 LAB
AORTIC ROOT: 3.4 CM
APICAL FOUR CHAMBER EJECTION FRACTION: 69 %
AV LVOT PEAK GRADIENT: 7 MMHG
AV PEAK GRADIENT: 14 MMHG
DOP CALC LVOT AREA: 3.14 CM2
DOP CALC LVOT DIAMETER: 2 CM
E WAVE DECELERATION TIME: 250 MS
FRACTIONAL SHORTENING: 40 % (ref 28–44)
INTERVENTRICULAR SEPTUM IN DIASTOLE (PARASTERNAL SHORT AXIS VIEW): 0.9 CM
INTERVENTRICULAR SEPTUM: 0.9 CM (ref 0.6–1.1)
LAAS-AP2: 32.5 CM2
LAAS-AP4: 36.2 CM2
LEFT ATRIUM SIZE: 4.6 CM
LEFT ATRIUM VOLUME (MOD BIPLANE): 145 ML
LEFT INTERNAL DIMENSION IN SYSTOLE: 3.2 CM (ref 2.1–4)
LEFT VENTRICULAR INTERNAL DIMENSION IN DIASTOLE: 5.3 CM (ref 3.5–6)
LEFT VENTRICULAR POSTERIOR WALL IN END DIASTOLE: 0.9 CM
LEFT VENTRICULAR STROKE VOLUME: 96 ML
LVSV (TEICH): 96 ML
MITRAL REGURGITATION PEAK VELOCITY: 4.34 M/S
MITRAL VALVE MEAN INFLOW VELOCITY: 3.43 M/S
MITRAL VALVE REGURGITANT PEAK GRADIENT: 76 MMHG
MV E'TISSUE VEL-SEP: 8 CM/S
MV PEAK A VEL: 1.01 M/S
MV PEAK E VEL: 92 CM/S
MV STENOSIS PRESSURE HALF TIME: 72 MS
MV VALVE AREA P 1/2 METHOD: 3.06 CM2
RIGHT ATRIUM AREA SYSTOLE A4C: 18.3 CM2
RIGHT VENTRICLE ID DIMENSION: 3.5 CM
SL CV DOP CALC MV VTI RETROGRADE: 130.5 CM
SL CV LEFT ATRIUM LENGTH A2C: 6.4 CM
SL CV MV MEAN GRADIENT RETROGRADE: 54 MMHG
SL CV PED ECHO LEFT VENTRICLE DIASTOLIC VOLUME (MOD BIPLANE) 2D: 136 ML
SL CV PED ECHO LEFT VENTRICLE SYSTOLIC VOLUME (MOD BIPLANE) 2D: 40 ML
TR MAX PG: 35 MMHG
TR PEAK VELOCITY: 3 M/S
TRICUSPID ANNULAR PLANE SYSTOLIC EXCURSION: 2.2 CM
TRICUSPID VALVE PEAK REGURGITATION VELOCITY: 2.95 M/S

## 2023-08-25 PROCEDURE — 93306 TTE W/DOPPLER COMPLETE: CPT | Performed by: INTERNAL MEDICINE

## 2023-08-25 NOTE — TELEPHONE ENCOUNTER
----- Message from Aurora Ortega MD sent at 8/25/2023 10:40 AM EDT -----  Patient has similar severity of moderate mitral regurgitation EF remained the same. Left atrium is severely dilated. Continue monitoring and keep appointment.

## 2023-09-05 DIAGNOSIS — I10 ESSENTIAL HYPERTENSION: ICD-10-CM

## 2023-09-05 RX ORDER — AMLODIPINE BESYLATE 10 MG/1
10 TABLET ORAL EVERY EVENING
Qty: 90 TABLET | Refills: 1 | Status: SHIPPED | OUTPATIENT
Start: 2023-09-05

## 2023-09-20 DIAGNOSIS — E78.5 DYSLIPIDEMIA: ICD-10-CM

## 2023-09-20 RX ORDER — ATORVASTATIN CALCIUM 10 MG/1
10 TABLET, FILM COATED ORAL DAILY
Qty: 90 TABLET | Refills: 3 | Status: SHIPPED | OUTPATIENT
Start: 2023-09-20

## 2023-11-27 DIAGNOSIS — I10 ESSENTIAL HYPERTENSION: ICD-10-CM

## 2023-11-27 RX ORDER — TELMISARTAN AND HYDROCHLORTHIAZIDE 80; 12.5 MG/1; MG/1
1 TABLET ORAL DAILY
Qty: 90 TABLET | Refills: 0 | Status: SHIPPED | OUTPATIENT
Start: 2023-11-27

## 2024-02-27 DIAGNOSIS — I10 ESSENTIAL HYPERTENSION: ICD-10-CM

## 2024-02-27 RX ORDER — TELMISARTAN AND HYDROCHLORTHIAZIDE 80; 12.5 MG/1; MG/1
1 TABLET ORAL DAILY
Qty: 90 TABLET | Refills: 0 | Status: SHIPPED | OUTPATIENT
Start: 2024-02-27

## 2024-02-27 RX ORDER — AMLODIPINE BESYLATE 10 MG/1
10 TABLET ORAL EVERY EVENING
Qty: 90 TABLET | Refills: 0 | Status: SHIPPED | OUTPATIENT
Start: 2024-02-27

## 2024-02-27 NOTE — TELEPHONE ENCOUNTER
amLODIPine (NORVASC) 10 mg tablet  90 day supply     telmisartan-hydrochlorothiazide (MICARDIS HCT) 80-12.5 MG per tablet   90-day supply     [facility]   Patient out of meds

## 2024-03-05 DIAGNOSIS — I10 ESSENTIAL HYPERTENSION: ICD-10-CM

## 2024-03-05 RX ORDER — TELMISARTAN AND HYDROCHLORTHIAZIDE 80; 12.5 MG/1; MG/1
1 TABLET ORAL DAILY
Qty: 90 TABLET | Refills: 3 | Status: SHIPPED | OUTPATIENT
Start: 2024-03-05

## 2024-03-05 RX ORDER — AMLODIPINE BESYLATE 10 MG/1
10 TABLET ORAL EVERY EVENING
Qty: 90 TABLET | Refills: 3 | Status: SHIPPED | OUTPATIENT
Start: 2024-03-05

## 2024-05-29 DIAGNOSIS — I10 ESSENTIAL HYPERTENSION: ICD-10-CM

## 2024-05-29 RX ORDER — AMLODIPINE BESYLATE 10 MG/1
10 TABLET ORAL EVERY EVENING
Qty: 90 TABLET | Refills: 3 | Status: SHIPPED | OUTPATIENT
Start: 2024-05-29

## 2024-06-17 DIAGNOSIS — I10 ESSENTIAL HYPERTENSION: ICD-10-CM

## 2024-06-17 RX ORDER — AMLODIPINE BESYLATE 10 MG/1
10 TABLET ORAL EVERY EVENING
Qty: 90 TABLET | Refills: 2 | OUTPATIENT
Start: 2024-06-17

## 2024-06-17 RX ORDER — AMLODIPINE BESYLATE 10 MG/1
10 TABLET ORAL EVERY EVENING
Qty: 90 TABLET | Refills: 2 | Status: SHIPPED | OUTPATIENT
Start: 2024-06-17

## 2024-06-17 NOTE — TELEPHONE ENCOUNTER
Reason for call:   [x] Refill   [] Prior Auth  [x] Other: making HP so mail order can rush this - was sent to [facility] and it canceled his mail order. [facility].     Office:   [] PCP/Provider -   [x] Specialty/Provider - Yin - CARDIO     Medication: NORVASC     Dose/Frequency: 10mg - daily     Quantity: 90    Pharmacy: [facility]     Does the patient have enough for 3 days?   [x] Yes   [] No - Send as HP to POD

## 2024-06-17 NOTE — TELEPHONE ENCOUNTER
Reason for call:   [] Refill   [] Prior Auth  [x] Other:     Patient is requesting for amlodipine to be canceled says he have his bottle that state he have 3 refills left, I explained he may not have refills remaining it is probably and old bottle. Patient insist we cancel this order that ws sent in a today for amlodipine and he will call us when he is ready for refill.    Called [facility] and cancel amlodipine spoke with Jennifer who transfer Tess who canceled the prescription for amlodipine 10 mg daily

## 2024-10-07 DIAGNOSIS — E78.5 DYSLIPIDEMIA: ICD-10-CM

## 2024-10-07 RX ORDER — ATORVASTATIN CALCIUM 10 MG/1
10 TABLET, FILM COATED ORAL DAILY
Qty: 90 TABLET | Refills: 3 | Status: SHIPPED | OUTPATIENT
Start: 2024-10-07

## 2024-10-28 DIAGNOSIS — I10 ESSENTIAL HYPERTENSION: ICD-10-CM

## 2024-10-28 DIAGNOSIS — E78.5 DYSLIPIDEMIA: ICD-10-CM

## 2024-10-29 RX ORDER — ATORVASTATIN CALCIUM 10 MG/1
10 TABLET, FILM COATED ORAL DAILY
Qty: 90 TABLET | Refills: 3 | Status: SHIPPED | OUTPATIENT
Start: 2024-10-29

## 2024-10-29 RX ORDER — TELMISARTAN AND HYDROCHLORTHIAZIDE 80; 12.5 MG/1; MG/1
1 TABLET ORAL DAILY
Qty: 90 TABLET | Refills: 3 | Status: SHIPPED | OUTPATIENT
Start: 2024-10-29

## 2024-10-29 RX ORDER — AMLODIPINE BESYLATE 10 MG/1
10 TABLET ORAL EVERY EVENING
Qty: 90 TABLET | Refills: 3 | Status: SHIPPED | OUTPATIENT
Start: 2024-10-29

## 2024-11-02 LAB — HBA1C MFR BLD HPLC: 6.2 %

## 2025-02-05 ENCOUNTER — OFFICE VISIT (OUTPATIENT)
Dept: CARDIOLOGY CLINIC | Facility: CLINIC | Age: 70
End: 2025-02-05
Payer: MEDICARE

## 2025-02-05 VITALS
BODY MASS INDEX: 24.59 KG/M2 | SYSTOLIC BLOOD PRESSURE: 110 MMHG | HEART RATE: 73 BPM | HEIGHT: 64 IN | WEIGHT: 144 LBS | DIASTOLIC BLOOD PRESSURE: 60 MMHG | OXYGEN SATURATION: 96 %

## 2025-02-05 DIAGNOSIS — I34.0 MODERATE MITRAL REGURGITATION: ICD-10-CM

## 2025-02-05 DIAGNOSIS — R01.1 CARDIAC MURMUR: ICD-10-CM

## 2025-02-05 DIAGNOSIS — E78.5 DYSLIPIDEMIA: ICD-10-CM

## 2025-02-05 DIAGNOSIS — F41.9 ANXIETY: ICD-10-CM

## 2025-02-05 DIAGNOSIS — I11.9 HYPERTENSIVE HEART DISEASE WITHOUT HEART FAILURE: ICD-10-CM

## 2025-02-05 DIAGNOSIS — R94.39 ABNORMAL STRESS ECG WITH TREADMILL: ICD-10-CM

## 2025-02-05 PROCEDURE — 93000 ELECTROCARDIOGRAM COMPLETE: CPT | Performed by: INTERNAL MEDICINE

## 2025-02-05 PROCEDURE — 99214 OFFICE O/P EST MOD 30 MIN: CPT | Performed by: INTERNAL MEDICINE

## 2025-02-05 RX ORDER — ATORVASTATIN CALCIUM 10 MG/1
10 TABLET, FILM COATED ORAL DAILY
Qty: 90 TABLET | Refills: 1 | Status: SHIPPED | OUTPATIENT
Start: 2025-02-05

## 2025-02-05 RX ORDER — ATORVASTATIN CALCIUM 10 MG/1
10 TABLET, FILM COATED ORAL DAILY
Qty: 90 TABLET | Refills: 1 | Status: SHIPPED | OUTPATIENT
Start: 2025-02-05 | End: 2025-02-05 | Stop reason: SDUPTHER

## 2025-02-05 NOTE — TELEPHONE ENCOUNTER
Reason for call:   [x] Refill   [] Prior Auth  [] Other:     Office:   [] PCP/Provider -   [x] Specialty/Provider - cardio     Medication: atorvastatin (LIPITOR) 10 mg tablet    Dose/Frequency: Sig: Take 1 tablet (10 mg total) by mouth daily    Quantity: 90    [facility]     Does the patient have enough for 3 days?   [] Yes   [x] No - Send as HP to POD

## 2025-02-05 NOTE — PROGRESS NOTES
Progress Note - Cardiology Office  [facility]    Luis Dang 69 y.o. male MRN: [id number]  : 1955  Encounter: [id number]      Assessment:     1. Hypertensive heart disease without heart failure    2. Cardiac murmur    3. Moderate mitral regurgitation    4. Dyslipidemia    5. Anxiety    6. Abnormal stress ECG with treadmill        Discussion Summary and Plan:  1. Hypertension.  Patient has longstanding history of essential hypertension.  Presently well controlled with amlodipine and Micardis HCT.  Blood testing 2024 are acceptable labs reviewed.      2.  Cardiac murmur.  Previous echo Doppler shows at least moderate mitral regurgitation reviewed by me.   Repeat echo Doppler is read as moderate previously has been at moderate to severe at least he has moderate mitral regurgitation.  The quality of the murmur appears to be similar.  He does have a history of mitral valve prolapse we will check a repeat echo Doppler.  I encouraged him not to lift more than 30 pounds.    3. Dyslipidemia.  Continue statins.  LDL is now 98 labs reviewed he is on Lipitor 10 mg.    4.  Anxiety.  Patient was reassured.  Advised him to continue current medications.  He is pretty active at his job he walked 12,000 steps today.    5. Mitral valve prolapse with at least moderate mitral regurgitation.  LV size was okay on last echo and repeat echo.  Repeat echo Doppler will be ordered    6. Abnormal exercise stress test.  Exercise stress test in  was abnormal due to LVH but nuclear shows no ischemia no change in symptoms we will continue medical Rx.    Continue Same Rx same Rx follow-up 7 to 8 months and repeat echo Doppler ordered.        Patient and his wife was advised and educated to call our office  immediately if  patient has any new symptoms of chest pain/shortness of breath, near-syncope, syncope, light headedness sustained palpitations  or any other cardiovascular symptoms before their  scheduled follow-up appointment.  Office #[phone].    Follow-up in 6 months    Counseling :  A description of the counseling.  Goals and Barriers.  Patient's ability to self care: Yes  Medication side effect reviewed with patient in detail and all their questions answered to their satisfaction.    HPI :     Luis Dang is a 69 y.o. year old male who came for follow up. Patient has history of hypertension hyperlipidemia benign prostate hypertrophy who was initially seen by me few years ago for hypertension. His cholesterol was previously diet controlled and has been doing well but over the year due to his age now his risk is higher and he is willing to go on statin therapy. He denies any chest pain or any shortness of breath. No fever no chills no PND no orthopnea no other significant complaint.    09/23/2020.    Above reviewed.  Patient came follow-up he is doing well his blood pressure is pretty well controlled.  He had a blood test done on 09/15/2020.  Most of the labs were acceptable cholesterol slightly high with .  LFTs were acceptable he was taking Lipitor 5 mg. He brought his diary of blood pressure most of the blood pressure readings are acceptable blood pressure here is also acceptable no dizziness no lightheadedness.  EKG shows heart rate 62 beats per minute no significant ST changes.  No nausea no vomiting no fever no PND no orthopnea no other issues.  He is trying to walk 15 miles a week.      Patient is known to have mitral regurgitation with moderate to severe in great secondary to mitral valve prolapse of posterior leaflet        10/21/2021.      Above reviewed.  Patient came for follow-up.  His blood pressure is well controlled.  He has medical history significant for at least moderate MR, history of posterior mitral valve prolapse, dyslipidemia who came for follow-up.  He had a cardiac workup done in June 2021 which shows normal LV systolic function moderate MR and nuclear stress test  shows no ischemia.  Since echo report reviewed with him he has no symptoms he is otherwise doing well.  Issues related to certain valve rupture also discussed with him.  His wife was present with him.    07/28/2022.    Above reviewed.  Patient came for follow-up he is doing well.  He has blood test done with his primary care doctor who has copy brought and very reviewed.  His cholesterol is 188 HDL is 62 . Previously his LDL used to be 78. He has history of moderate MR with posterior mitral leaflet prolapse, dyslipidemia, hypertension and he has been doing well.  His cardiac workup from June 2021 reviewed.  No other significant issues he is trying to exercise every day and trying to eat healthy weight.  No other symptoms at this time.    8/3/2023.    Above reviewed.  Patient came for follow-up he is doing well.  He has medical history significant for during procedure mitral prolapse with moderate MR, hypertension, dyslipidemia who came for follow-up.  He denies any chest pain any shortness of breath.  He is encouraged not to lift more than 30 pounds.  He is otherwise very active EKG shows sinus rhythm with LVH.  His medication reviewed he is on Micardis HCT, amlodipine 10 mg and Lipitor 10 mg.    2/5/2025.    Above reviewed.  Patient came for follow-up.  Patient has a medical history significant for mitral valve prolapse with moderate MR, hypertensive heart disease, dyslipidemia who came for follow-up.  His echo from 2023 shows his EF is 63% left him severely dilated mild to moderate MR and mild TR.  He is encouraged not to lift heavy weights.  He is otherwise very active EKG was sinus rhythm with LVH.  His medication list reviewed with him.  His blood test from August 2023 reviewed and were acceptable.  No recent blood test.  He is doing well.  He has blood test done in 11/2/2024 which has been acceptable his cholesterol profile was also acceptable thyroid function TSH was 1.7 HbA1c 6.2 and LFTs were  acceptable.  He is EKG today shows sinus rhythm with a heart rate 73 bpm few PACs LVH by voltage.  He still works with he walks about 2 miles a day.        Review of Systems   Constitutional:  Negative for activity change, chills, diaphoresis, fever and unexpected weight change.   HENT:  Negative for congestion.    Eyes:  Negative for discharge and redness.   Respiratory:  Negative for cough, chest tightness, shortness of breath and wheezing.    Cardiovascular: Negative.  Negative for chest pain, palpitations and leg swelling.   Gastrointestinal:  Negative for abdominal pain, diarrhea and nausea.   Endocrine: Negative.    Genitourinary:  Negative for decreased urine volume and urgency.   Musculoskeletal: Negative.  Negative for arthralgias, back pain and gait problem.   Skin:  Negative for rash and wound.   Allergic/Immunologic: Negative.    Neurological:  Negative for dizziness, seizures, syncope, weakness, light-headedness and headaches.   Hematological: Negative.    Psychiatric/Behavioral:  Negative for agitation and confusion. The patient is not nervous/anxious.        Historical Information   Past Medical History:   Diagnosis Date   • Colon polyp    • Hyperlipidemia    • Hypertension      Past Surgical History:   Procedure Laterality Date   • COLONOSCOPY     • NO PAST SURGERIES       Social History     Substance and Sexual Activity   Alcohol Use Yes   • Alcohol/week: 2.0 standard drinks of alcohol   • Types: 2 Glasses of wine per week    Comment: occ     Social History     Substance and Sexual Activity   Drug Use No     Social History     Tobacco Use   Smoking Status Never   Smokeless Tobacco Never     Family History:   Family History   Problem Relation Age of Onset   • Kidney failure Father    • Hypertension Brother    • No Known Problems Mother    • No Known Problems Sister    • No Known Problems Maternal Aunt    • No Known Problems Maternal Uncle    • No Known Problems Paternal Aunt    • No Known Problems  "Paternal Uncle    • No Known Problems Maternal Grandmother    • No Known Problems Maternal Grandfather    • No Known Problems Paternal Grandmother    • No Known Problems Paternal Grandfather        Meds/Allergies     No Known Allergies    Current Outpatient Medications:   •  amLODIPine (NORVASC) 10 mg tablet, Take 1 tablet (10 mg total) by mouth every evening, Disp: 90 tablet, Rfl: 3  •  atorvastatin (LIPITOR) 10 mg tablet, Take 1 tablet (10 mg total) by mouth daily, Disp: 90 tablet, Rfl: 1  •  Silodosin 8 MG CAPS, Take by mouth, Disp: , Rfl:   •  telmisartan-hydrochlorothiazide (MICARDIS HCT) 80-12.5 MG per tablet, Take 1 tablet by mouth daily, Disp: 90 tablet, Rfl: 3  •  Calcium Ascorbate (VITAMIN C) 500 mg tablet, Take 500 mg by mouth daily (Patient not taking: Reported on 8/3/2023), Disp: , Rfl:     Vitals: Blood pressure 110/60, pulse 73, height 5' 4\" (1.626 m), weight 65.3 kg (144 lb), SpO2 96%.    Body mass index is 24.72 kg/m².  Vitals:    02/05/25 1314   Weight: 65.3 kg (144 lb)       BP Readings from Last 3 Encounters:   02/05/25 110/60   08/24/23 120/70   08/03/23 120/70         Physical Exam  Constitutional:       General: He is not in acute distress.     Appearance: He is well-developed. He is not diaphoretic.   Neck:      Thyroid: No thyromegaly.      Vascular: No JVD.      Trachea: No tracheal deviation.   Cardiovascular:      Rate and Rhythm: Normal rate and regular rhythm.      Heart sounds: S1 normal and S2 normal. Heart sounds not distant. Murmur heard.      Systolic (ejection) murmur is present with a grade of 2/6.      No friction rub. No gallop. No S3 or S4 sounds.      Comments: S1-S2 regular with a 3/6 pansystolic murmur.  Pulmonary:      Effort: Pulmonary effort is normal. No respiratory distress.      Breath sounds: Normal breath sounds. No wheezing or rales.   Chest:      Chest wall: No tenderness.   Abdominal:      General: Bowel sounds are normal. There is no distension.      Palpations: " Abdomen is soft.      Tenderness: There is no abdominal tenderness.   Musculoskeletal:         General: No deformity.      Cervical back: Neck supple.   Skin:     General: Skin is warm and dry.      Coloration: Skin is not pale.      Findings: No rash.   Neurological:      Mental Status: He is alert and oriented to person, place, and time.   Psychiatric:         Behavior: Behavior normal.         Judgment: Judgment normal.          Diagnostic Studies Review Cardio:  Lab Review: Patient has blood test done outside. Cholesterol was 147 triglyceride 52 HDL 60 LDL 76. LFTs are acceptable. Hemoglobin 14.4 hematocrit 45. HbA1c 6.0. LFTs were acceptable. Potassium 3.8 sodium 141.      Stress Test: Nuclear stress test in December 2015 shows no ischemia EF was 60%.        Nuclear stress test.  Nuclear stress test done in June 2021 shows no ischemia normal LV systolic function.    Echocardiogram/TAMIKA: Echo Doppler in 2012 shows a systolic function, mild MR, and a repeat echo in January 2017 shows normal systolic function with no significant change. Mild MR was noted.      repeat echo Doppler done in June 2021 shows EF 60 65% mild LVH, moderate prolapse of posterior leaflet with moderate MR, mild TR PA pressure 35 mmHg.    Repeat echo Doppler done in August 2023 shows patient's EF 65%, left atrium severely dilated, mild collapse of the posterior leaflet with at least mild to moderate MR, mild to moderate TR with PA pressure around 40 mmHg.    ECG Report: Twelve-lead EKG done 4/3/2017 shows normal sinus done no significant   ST changes no change from old EKG     Repeat EKG shows normal sinus rhythm heart rate 58 beats per minute.  No significant ST changes. High voltage may be due to thin chest.    Twelve lead EKG done in our office 01/21/2019 shows normal sinus rhythm LVH by voltage.  No other significant ST changes.      Twelve lead EKG 10/11/2019 shows normal sinus rhythm heart rate 81 beats per minute.      Twelve lead EKG  "done 09/23/2020 shows normal sinus moderate voltage for LVH no change from old EKG.    Twelve lead EKG done on 04/20/2021 shows sinus rhythm heart rate 63 beats per minute LVH by voltage nose change from previous EKG.     12 lead EKG done 10/21/2021 shows normal sinus rhythm heart rate 69 beats per minute LVH by voltage no change from previous EKG    Twelve lead EKG 07/28/2022 shows normal sinus rhythm heart rate 72 beats per minute no significant change from previous EKG.    Twelve-lead EKG done on 8/3/2023 shows normal sinus rhythm heart rate 68 bpm LVH by voltage.  As compared to previous EKG no change.    Twelve-lead EKG done on 2/5/2025 normal sinus rhythm with a heart rate 73 bpm few PACs LVH by voltage.  No other significant change.          Patient had blood test done at private facility which included BMP, CBC, TSH C-reactive protein vitamin-D there were all acceptable TSH was 1.6 and his creatinine was 0.93.  No cholesterol test was done    Patient has blood test done with his primary care doctor July 2022 reviewed cholesterol was 188 with triglyceride 66  HbA1c 6.0 and electrolytes were acceptable.    Dr. Tiburcio Yin MD [facility]      \"This note has been constructed using a voice recognition system.Therefore there may be syntax, spelling, and/or grammatical errors. Please call if you have any questions. \"  "

## 2025-02-14 ENCOUNTER — HOSPITAL ENCOUNTER (OUTPATIENT)
Dept: NON INVASIVE DIAGNOSTICS | Facility: HOSPITAL | Age: 70
Discharge: HOME | End: 2025-02-14
Attending: INTERNAL MEDICINE
Payer: MEDICARE

## 2025-02-14 ENCOUNTER — RESULTS FOLLOW-UP (OUTPATIENT)
Dept: CARDIOLOGY CLINIC | Facility: CLINIC | Age: 70
End: 2025-02-14

## 2025-02-14 VITALS
HEART RATE: 74 BPM | BODY MASS INDEX: 24.59 KG/M2 | DIASTOLIC BLOOD PRESSURE: 73 MMHG | HEIGHT: 64 IN | WEIGHT: 144 LBS | SYSTOLIC BLOOD PRESSURE: 129 MMHG

## 2025-02-14 DIAGNOSIS — I34.0 MODERATE MITRAL REGURGITATION: ICD-10-CM

## 2025-02-14 DIAGNOSIS — E78.5 DYSLIPIDEMIA: ICD-10-CM

## 2025-02-14 DIAGNOSIS — R94.39 ABNORMAL STRESS ECG WITH TREADMILL: ICD-10-CM

## 2025-02-14 DIAGNOSIS — R01.1 CARDIAC MURMUR: ICD-10-CM

## 2025-02-14 DIAGNOSIS — I11.9 HYPERTENSIVE HEART DISEASE WITHOUT HEART FAILURE: ICD-10-CM

## 2025-02-14 DIAGNOSIS — F41.9 ANXIETY: ICD-10-CM

## 2025-02-14 LAB
AORTIC ROOT: 3.3 CM
ASCENDING AORTA: 3.3 CM
AV LVOT PEAK GRADIENT: 7 MMHG
AV PEAK GRADIENT: 12 MMHG
BSA FOR ECHO PROCEDURE: 1.7 M2
DOP CALC LVOT AREA: 3.14 CM2
DOP CALC LVOT DIAMETER: 2 CM
E WAVE DECELERATION TIME: 233 MS
E/A RATIO: 1.01
FRACTIONAL SHORTENING: 35 (ref 28–44)
INTERVENTRICULAR SEPTUM IN DIASTOLE (PARASTERNAL SHORT AXIS VIEW): 1.1 CM
INTERVENTRICULAR SEPTUM: 1.1 CM (ref 0.6–1.1)
LAAS-AP2: 26.8 CM2
LAAS-AP4: 31.1 CM2
LEFT ATRIUM SIZE: 4.4 CM
LEFT ATRIUM VOLUME (MOD BIPLANE): 101 ML
LEFT ATRIUM VOLUME INDEX (MOD BIPLANE): 59.4 ML/M2
LEFT INTERNAL DIMENSION IN SYSTOLE: 3.3 CM (ref 2.1–4)
LEFT VENTRICULAR INTERNAL DIMENSION IN DIASTOLE: 5.1 CM (ref 3.5–6)
LEFT VENTRICULAR POSTERIOR WALL IN END DIASTOLE: 0.9 CM
LEFT VENTRICULAR STROKE VOLUME: 79 ML
LV EF US.2D.A4C+ESTIMATED: 74 %
LVSV (TEICH): 79 ML
MITRAL REGURGITATION PEAK VELOCITY: 3.94 M/S
MITRAL VALVE MEAN INFLOW VELOCITY: 3.2 M/S
MITRAL VALVE REGURGITANT PEAK GRADIENT: 62 MMHG
MV E'TISSUE VEL-LAT: 14 CM/S
MV E'TISSUE VEL-SEP: 9 CM/S
MV PEAK A VEL: 1.04 M/S
MV PEAK E VEL: 105 CM/S
MV STENOSIS PRESSURE HALF TIME: 67 MS
MV VALVE AREA P 1/2 METHOD: 3.28
RIGHT ATRIUM AREA SYSTOLE A4C: 18.7 CM2
RIGHT VENTRICLE ID DIMENSION: 3.1 CM
SINOTUBULAR JUNCTION: 3 CM
SL CV DOP CALC MV VTI RETROGRADE: 125.1 CM
SL CV LEFT ATRIUM LENGTH A2C: 6.6 CM
SL CV LV EF: 65
SL CV MV MEAN GRADIENT RETROGRADE: 44 MMHG
SL CV PED ECHO LEFT VENTRICLE DIASTOLIC VOLUME (MOD BIPLANE) 2D: 123 ML
SL CV PED ECHO LEFT VENTRICLE SYSTOLIC VOLUME (MOD BIPLANE) 2D: 45 ML
SL CV SINUS OF VALSALVA 2D: 3.1 CM
STJ: 3 CM
TR MAX PG: 26 MMHG
TR PEAK VELOCITY: 2.6 M/S
TRICUSPID ANNULAR PLANE SYSTOLIC EXCURSION: 2.3 CM
TRICUSPID VALVE PEAK REGURGITATION VELOCITY: 2.57 M/S

## 2025-02-14 PROCEDURE — 93306 TTE W/DOPPLER COMPLETE: CPT | Performed by: INTERNAL MEDICINE

## 2025-02-14 PROCEDURE — 93306 TTE W/DOPPLER COMPLETE: CPT

## 2025-02-14 NOTE — TELEPHONE ENCOUNTER
----- Message from Tiburcio Yin MD sent at 2/14/2025  3:36 PM EST -----  Patient's echo shows normal LV systolic function.  Mild to moderate mild regurgitation.  Patient can keep an appointment.  No change significant from previous echo.    Please call patient about echo report.

## 2025-05-16 DIAGNOSIS — I10 ESSENTIAL HYPERTENSION: ICD-10-CM

## 2025-05-18 RX ORDER — AMLODIPINE BESYLATE 10 MG/1
10 TABLET ORAL EVERY EVENING
Qty: 90 TABLET | Refills: 1 | Status: SHIPPED | OUTPATIENT
Start: 2025-05-18

## 2025-05-18 RX ORDER — TELMISARTAN AND HYDROCHLORTHIAZIDE 80; 12.5 MG/1; MG/1
1 TABLET ORAL DAILY
Qty: 90 TABLET | Refills: 3 | Status: SHIPPED | OUTPATIENT
Start: 2025-05-18